# Patient Record
Sex: FEMALE | Race: BLACK OR AFRICAN AMERICAN | NOT HISPANIC OR LATINO | Employment: UNEMPLOYED | URBAN - METROPOLITAN AREA
[De-identification: names, ages, dates, MRNs, and addresses within clinical notes are randomized per-mention and may not be internally consistent; named-entity substitution may affect disease eponyms.]

---

## 2018-04-05 ENCOUNTER — APPOINTMENT (OUTPATIENT)
Dept: ULTRASOUND IMAGING | Facility: CLINIC | Age: 23
End: 2018-04-05
Attending: EMERGENCY MEDICINE
Payer: COMMERCIAL

## 2018-04-05 ENCOUNTER — HOSPITAL ENCOUNTER (EMERGENCY)
Facility: CLINIC | Age: 23
Discharge: HOME | End: 2018-04-05
Attending: EMERGENCY MEDICINE | Admitting: EMERGENCY MEDICINE
Payer: COMMERCIAL

## 2018-04-05 VITALS
SYSTOLIC BLOOD PRESSURE: 102 MMHG | RESPIRATION RATE: 18 BRPM | OXYGEN SATURATION: 100 % | WEIGHT: 124 LBS | DIASTOLIC BLOOD PRESSURE: 67 MMHG | TEMPERATURE: 98.4 F | BODY MASS INDEX: 21.97 KG/M2 | HEIGHT: 63 IN | HEART RATE: 83 BPM

## 2018-04-05 DIAGNOSIS — R10.84 ABDOMINAL PAIN, GENERALIZED: ICD-10-CM

## 2018-04-05 DIAGNOSIS — N93.9 VAGINAL BLEEDING: ICD-10-CM

## 2018-04-05 DIAGNOSIS — Z33.1 PREGNANCY, INCIDENTAL: ICD-10-CM

## 2018-04-05 LAB
ABO + RH BLD: NORMAL
ABO + RH BLD: NORMAL
ALBUMIN UR-MCNC: NEGATIVE MG/DL
APPEARANCE UR: CLEAR
B-HCG SERPL-ACNC: 20 IU/L (ref 0–5)
BACTERIA #/AREA URNS HPF: ABNORMAL /HPF
BASOPHILS # BLD AUTO: 0 10E9/L (ref 0–0.2)
BASOPHILS NFR BLD AUTO: 0.2 %
BILIRUB UR QL STRIP: NEGATIVE
BLOOD BANK CMNT PATIENT-IMP: NORMAL
BLOOD BANK CMNT PATIENT-IMP: NORMAL
COLOR UR AUTO: ABNORMAL
DIFFERENTIAL METHOD BLD: NORMAL
EOSINOPHIL # BLD AUTO: 0.1 10E9/L (ref 0–0.7)
EOSINOPHIL NFR BLD AUTO: 1.4 %
ERYTHROCYTE [DISTWIDTH] IN BLOOD BY AUTOMATED COUNT: 13.3 % (ref 10–15)
FETAL CELL SCN BLD QL ROSETTE: NORMAL
GLUCOSE UR STRIP-MCNC: NEGATIVE MG/DL
HCT VFR BLD AUTO: 41.8 % (ref 35–47)
HGB BLD-MCNC: 13.6 G/DL (ref 11.7–15.7)
HGB UR QL STRIP: ABNORMAL
IMM GRANULOCYTES # BLD: 0 10E9/L (ref 0–0.4)
IMM GRANULOCYTES NFR BLD: 0.5 %
KETONES UR STRIP-MCNC: NEGATIVE MG/DL
LEUKOCYTE ESTERASE UR QL STRIP: NEGATIVE
LYMPHOCYTES # BLD AUTO: 0.8 10E9/L (ref 0.8–5.3)
LYMPHOCYTES NFR BLD AUTO: 13.9 %
MCH RBC QN AUTO: 28.2 PG (ref 26.5–33)
MCHC RBC AUTO-ENTMCNC: 32.5 G/DL (ref 31.5–36.5)
MCV RBC AUTO: 87 FL (ref 78–100)
MONOCYTES # BLD AUTO: 0.4 10E9/L (ref 0–1.3)
MONOCYTES NFR BLD AUTO: 6.7 %
MUCOUS THREADS #/AREA URNS LPF: PRESENT /LPF
NEUTROPHILS # BLD AUTO: 4.5 10E9/L (ref 1.6–8.3)
NEUTROPHILS NFR BLD AUTO: 77.3 %
NITRATE UR QL: NEGATIVE
NRBC # BLD AUTO: 0 10*3/UL
NRBC BLD AUTO-RTO: 0 /100
PH UR STRIP: 7 PH (ref 5–7)
PLATELET # BLD AUTO: 191 10E9/L (ref 150–450)
RBC # BLD AUTO: 4.83 10E12/L (ref 3.8–5.2)
RBC #/AREA URNS AUTO: 170 /HPF (ref 0–2)
RH IG VIALS RECOM PATIENT: NORMAL
SOURCE: ABNORMAL
SP GR UR STRIP: 1 (ref 1–1.03)
SQUAMOUS #/AREA URNS AUTO: 1 /HPF (ref 0–1)
UROBILINOGEN UR STRIP-MCNC: 0 MG/DL (ref 0–2)
WBC # BLD AUTO: 5.8 10E9/L (ref 4–11)
WBC #/AREA URNS AUTO: 5 /HPF (ref 0–5)

## 2018-04-05 PROCEDURE — 99284 EMERGENCY DEPT VISIT MOD MDM: CPT | Mod: 25

## 2018-04-05 PROCEDURE — 86900 BLOOD TYPING SEROLOGIC ABO: CPT | Performed by: EMERGENCY MEDICINE

## 2018-04-05 PROCEDURE — 86901 BLOOD TYPING SEROLOGIC RH(D): CPT | Performed by: EMERGENCY MEDICINE

## 2018-04-05 PROCEDURE — 85025 COMPLETE CBC W/AUTO DIFF WBC: CPT | Performed by: EMERGENCY MEDICINE

## 2018-04-05 PROCEDURE — 81001 URINALYSIS AUTO W/SCOPE: CPT | Performed by: EMERGENCY MEDICINE

## 2018-04-05 PROCEDURE — 76801 OB US < 14 WKS SINGLE FETUS: CPT

## 2018-04-05 PROCEDURE — 36415 COLL VENOUS BLD VENIPUNCTURE: CPT | Performed by: EMERGENCY MEDICINE

## 2018-04-05 PROCEDURE — 85461 HEMOGLOBIN FETAL: CPT | Performed by: EMERGENCY MEDICINE

## 2018-04-05 PROCEDURE — 84702 CHORIONIC GONADOTROPIN TEST: CPT | Performed by: EMERGENCY MEDICINE

## 2018-04-05 ASSESSMENT — ENCOUNTER SYMPTOMS: ABDOMINAL PAIN: 1

## 2018-04-05 NOTE — ED AVS SNAPSHOT
[facility] Emergency Department    [address]    [facility] [address]    Phone:  [phone]    Fax:  [phone]                                       Juliana Rodriguez   MRN: [id number]    Department:  [facility] Emergency Department   Date of Visit:  4/5/2018           After Visit Summary Signature Page     I have received my discharge instructions, and my questions have been answered. I have discussed any challenges I see with this plan with the nurse or doctor.    ..........................................................................................................................................  Patient/Patient Representative Signature      ..........................................................................................................................................  Patient Representative Print Name and Relationship to Patient    ..................................................               ................................................  Date                                            Time    ..........................................................................................................................................  Reviewed by Signature/Title    ...................................................              ..............................................  Date                                                            Time

## 2018-04-05 NOTE — ED NOTES
Pt is approx 8 weeks pregnant.  She had positive UPT at home.  She now has vaginal bleeding with abdominal cramping since 0930 this AM.

## 2018-04-05 NOTE — ED PROVIDER NOTES
"  History     Chief Complaint:  Vaginal Bleeding      HPI   Juliana Rodriguez is a 22 year old female who is an estimated 8 weeks pregnant and  who presents to the emergency department for evaluation of vaginal bleeding. The patient miscarried in her second pregnancy when she was 6 months pregnant. She reports that the first day of her last period was 18. The patient had a positive UPT at home two days ago, and has not yet been evaluated by her Ob Gyn. She reports a small amount of vaginal bleeding of bright red blood this morning followed by some lower abdominal cramping. She reports liquid blood and no clots passed. The cramping and bleeding has continued since 930 and she has used two pads. The patient also notes some vaginal discharge and concern for yeast infection.     Allergies:  Influenza Vaccine Live    Medications:    Prenatal vitamins    Past Medical History:    Miscarriage    Past Surgical History:    History reviewed. No pertinent surgical history.    Family History:    Family history reviewed. No pertinent family history.    Social History:  The patient was accompanied to the emergency department by her .   Smoking Status: Never Smoker  Smokeless Tobacco: Unknown  Alcohol Use: No  Marital Status:      Review of Systems   Gastrointestinal: Positive for abdominal pain.   Genitourinary: Positive for vaginal bleeding and vaginal discharge.   All other systems reviewed and are negative.    Physical Exam     Patient Vitals for the past 24 hrs:   BP Temp Temp src Pulse Heart Rate Resp SpO2 Height Weight   18 1445 102/67 - - 83 83 - 100 % - -   18 1430 101/68 - - - - - - - -   18 1322 101/65 - - 71 71 - 100 % 1.6 m (5' 3\") 56.2 kg (124 lb)   18 1223 116/80 98.4  F (36.9  C) Temporal - 83 18 99 % 1.6 m (5' 3\") 56.2 kg (124 lb)     Physical Exam    Constitutional:  Pleasant, age appropriate.       Resting comfortably in the bed.  Eyes:    Conjunctiva normal  Neck:  "   Supple, no meningismus.     CV:     Regular rate and rhythm.      No murmurs, rubs or gallops.     No lower extremity edema.  PULM:    Clear to auscultation bilateral.       No respiratory distress.      Good air exchange.  ABD:    Soft, non-distended.       No abdominal tenderness.     Bowel sounds normal.     No pulsatile masses.       No rebound, guarding or rigidity.     No CVA tenderness.   :    Normal external genitalia.     No perineal lesions.     Small amount of dark blood in the in the vaginal vault.     No clots present.     No visualized tissue     No vaginal discharge.     Cervical os is not open.     No cervical motion tenderness     No adnexal mass.  MSK:     No gross deformity to all four extremities.   LYMPH:   No cervical lymphadenopathy.  NEURO:   Alert.  Good muscular tone, no atrophy.   Skin:    Warm, dry and intact.    Psych:    Mood is good and affect is appropriate.      Emergency Department Course     Imaging:  Radiology findings were communicated with the patient who voiced understanding of the findings.    US OB < 14 Weeks Single  1. No intrauterine gestation identified. Differential diagnosis  discussed above.  2. Normal pelvic ultrasound.  Reading per radiology.     Laboratory:  Laboratory findings were communicated with the patient who voiced understanding of the findings.    CBC: WBC 5.8, HGB 13.6,   HCG quantitative pregnancy blood: 20 (H)  UA: Blood Large (A), RBC/ (H), Bacteria Few (A), Mucous Present (A), o/w Negative   Rh immune globin study: O Positive    Emergency Department Course:     Nursing notes and vitals reviewed.     I performed an exam of the patient as documented above.     IV was inserted and blood was drawn for laboratory testing, results above.     The patient provided a urine sample here in the emergency department. This was sent for laboratory testing, findings above.    The patient was sent for an ultrasound while in the emergency department,  results above.    I personally reviewed the laboratory and ultrasound results with the patient and answered all related questions prior to discharge.    Impression & Plan      Medical Decision Making:  Juliana Rodriguez is a 22 year old female who presents to the emergency department today with vaginal bleeding and lower abdominal cramping.  Pelvic examination reveals no evidence of vaginitis, cervicitis or PID.  Unfortunately hCG is much lower than expected at 20 and a pelvic ultrasound reveals no evidence of intra-uterine pregnancy.  Symptoms are really quite consistent with spontaneous  and would be very unlikely to represent early normal intrauterine pregnancy with inappropriate dates or ectopic pregnancy although cannot be certain at this time.  Patient safe for discharge home.  She is not a RhoGam candidate.  Patient to follow-up with OB/GYN in the next 2-3 days.    Diagnosis:    ICD-10-CM    1. Abdominal pain, generalized R10.84    2. Pregnancy, incidental Z33.1    3. Vaginal bleeding N93.9      Disposition:   The patient was discharged home.      Scribe Disclosure:  I, Marielena Huynh, am serving as a scribe at 1:34 PM on 2018 to document services personally performed by Rajeev Tuttle MD based on my observations and the provider's statements to me.    [facility] EMERGENCY DEPARTMENT       Rajeev Tuttle MD  [phone]

## 2018-04-05 NOTE — ED AVS SNAPSHOT
[facility] Emergency Department    [address] [place] [address]    Phone:  [phone]    Fax:  [phone]                                       Juliana Rodriguez   MRN: [id number]    Department:  [facility] Emergency Department   Date of Visit:  4/5/2018           Patient Information     Date Of Birth          1995        Your diagnoses for this visit were:     Abdominal pain, generalized     Pregnancy, incidental     Vaginal bleeding        You were seen by Rajeev Tuttle MD.      Follow-up Information     Follow up with Susy Liao DO In 3 days.    Specialty:  OB/Gyn    Contact information:    [facility]  [id number]   [place] [address]  [phone]        Discharge References/Attachments     ABDOMINAL PAIN, EARLY PREGNANCY (ENGLISH)      24 Hour Appointment Hotline       To make an appointment at any [facility], call [phone] ([phone]). If you don't have a family doctor or clinic, we will help you find one. [place] clinics are conveniently located to serve the needs of you and your family.             Review of your medicines      Our records show that you are taking the medicines listed below. If these are incorrect, please call your family doctor or clinic.        Dose / Directions Last dose taken    breast pump Misc   Dose:  1 each   Quantity:  1 each        1 each as needed   Refills:  0        ibuprofen 600 MG tablet   Commonly known as:  ADVIL/MOTRIN   Dose:  600 mg   Quantity:  30 tablet        Take 1 tablet (600 mg) by mouth every 6 hours as needed for other (cramping)   Refills:  1        prenatal multivitamin plus iron 27-0.8 MG Tabs per tablet   Dose:  1 tablet   Quantity:  90 tablet        Take 1 tablet by mouth daily   Refills:  3        senna-docusate 8.6-50 MG per tablet   Commonly known as:  SENOKOT-S;PERICOLACE   Dose:  1-2 tablet   Quantity:  30 tablet        Take 1-2 tablets by mouth 2 times  daily   Refills:  1                Procedures and tests performed during your visit     CBC + differential    HCG QUANTitative pregnancy (blood)    Rh Immune Globulin Study    Rho (D) immune globulin (RhoGam) Lab Study    UA with Microscopic    US OB < 14 Weeks Single      Orders Needing Specimen Collection     None      Pending Results     Date and Time Order Name Status Description    4/5/2018 1337 US OB < 14 Weeks Single Preliminary             Pending Culture Results     No orders found from 4/3/2018 to 4/6/2018.            Pending Results Instructions     If you had any lab results that were not finalized at the time of your Discharge, you can call the ED Lab Result RN at [phone]. You will be contacted by this team for any positive Lab results or changes in treatment. The nurses are available 7 days a week from 10A to 6:30P.  You can leave a message 24 hours per day and they will return your call.        Test Results From Your Hospital Stay        4/5/2018 12:45 PM      Component Results     Component    Rhogam Order    Order received    Comment:    See Rhogam Study/Suitability         4/5/2018  2:02 PM      Component Results     Component    ABO    O    RH(D)    Pos    Fetal Blood Screen    Canceled, Test credited    Blood Bank Comment    Not suitable for Rh Immune Globulin  Patient is Rh positive      Amount of RHIG Required    Not suitable for Rh Immune Globulin         4/5/2018  2:30 PM      Component Results     Component Value Ref Range & Units Status    WBC 5.8 4.0 - 11.0 10e9/L Final    RBC Count 4.83 3.8 - 5.2 10e12/L Final    Hemoglobin 13.6 11.7 - 15.7 g/dL Final    Hematocrit 41.8 35.0 - 47.0 % Final    MCV 87 78 - 100 fl Final    MCH 28.2 26.5 - 33.0 pg Final    MCHC 32.5 31.5 - 36.5 g/dL Final    RDW 13.3 10.0 - 15.0 % Final    Platelet Count 191 150 - 450 10e9/L Final    Diff Method Automated Method  Final    % Neutrophils 77.3 % Final    % Lymphocytes 13.9 % Final    % Monocytes 6.7 % Final     % Eosinophils 1.4 % Final    % Basophils 0.2 % Final    % Immature Granulocytes 0.5 % Final    Nucleated RBCs 0 0 /100 Final    Absolute Neutrophil 4.5 1.6 - 8.3 10e9/L Final    Absolute Lymphocytes 0.8 0.8 - 5.3 10e9/L Final    Absolute Monocytes 0.4 0.0 - 1.3 10e9/L Final    Absolute Eosinophils 0.1 0.0 - 0.7 10e9/L Final    Absolute Basophils 0.0 0.0 - 0.2 10e9/L Final    Abs Immature Granulocytes 0.0 0 - 0.4 10e9/L Final    Absolute Nucleated RBC 0.0  Final         4/5/2018  2:49 PM      Component Results     Component Value Ref Range & Units Status    HCG Quantitative Serum 20 (H) 0 - 5 IU/L Final               4/5/2018  2:10 PM      Component Results     Component Value Ref Range & Units Status    Color Urine Straw  Final    Appearance Urine Clear  Final    Glucose Urine Negative NEG^Negative mg/dL Final    Bilirubin Urine Negative NEG^Negative Final    Ketones Urine Negative NEG^Negative mg/dL Final    Specific Gravity Urine 1.005 1.003 - 1.035 Final    Blood Urine Large (A) NEG^Negative Final    pH Urine 7.0 5.0 - 7.0 pH Final    Protein Albumin Urine Negative NEG^Negative mg/dL Final    Urobilinogen mg/dL 0.0 0.0 - 2.0 mg/dL Final    Nitrite Urine Negative NEG^Negative Final    Leukocyte Esterase Urine Negative NEG^Negative Final    Source Midstream Urine  Final    WBC Urine 5 0 - 5 /HPF Final    RBC Urine 170 (H) 0 - 2 /HPF Final    Bacteria Urine Few (A) NEG^Negative /HPF Final    Squamous Epithelial /HPF Urine 1 0 - 1 /HPF Final    Mucous Urine Present (A) NEG^Negative /LPF Final         4/5/2018  4:02 PM      Narrative     ULTRASOUND OBSTETRIC LESS THAN 14 WEEKS SINGLE  4/5/2018 3:30 PM    HISTORY: First trimester bleeding.    TECHNIQUE: Transabdominal exam only. Patient refused transvaginal  study.    COMPARISON: None.    FINDINGS: No intrauterine gestational sac is identified. Endometrial  stripe thickness is 0.8 cm. Differential diagnosis includes very early  intrauterine gestation, complete  spontaneous  or, less likely,  ectopic pregnancy. Clinical correlation with serum beta hCG is  recommended.    Right ovary: Unremarkable.  Left ovary: Unremarkable.  Adnexal mass: None.  Free pelvic fluid: None.        Impression     IMPRESSION:   1. No intrauterine gestation identified. Differential diagnosis  discussed above.  2. Normal pelvic ultrasound.                  Clinical Quality Measure: Blood Pressure Screening     Your blood pressure was checked while you were in the emergency department today. The last reading we obtained was  BP: 102/67 . Please read the guidelines below about what these numbers mean and what you should do about them.  If your systolic blood pressure (the top number) is less than 120 and your diastolic blood pressure (the bottom number) is less than 80, then your blood pressure is normal. There is nothing more that you need to do about it.  If your systolic blood pressure (the top number) is 120-139 or your diastolic blood pressure (the bottom number) is 80-89, your blood pressure may be higher than it should be. You should have your blood pressure rechecked within a year by a primary care provider.  If your systolic blood pressure (the top number) is 140 or greater or your diastolic blood pressure (the bottom number) is 90 or greater, you may have high blood pressure. High blood pressure is treatable, but if left untreated over time it can put you at risk for heart attack, stroke, or kidney failure. You should have your blood pressure rechecked by a primary care provider within the next 4 weeks.  If your provider in the emergency department today gave you specific instructions to follow-up with your doctor or provider even sooner than that, you should follow that instruction and not wait for up to 4 weeks for your follow-up visit.        Thank you for choosing [facility]       Thank you for choosing [facility] for your care. Our goal is always to provide you with excellent care.  "Hearing back from our patients is one way we can continue to improve our services. Please take a few minutes to complete the written survey that you may receive in the mail after you visit with us. Thank you!        [employer] Information     [employer] lets you send messages to your doctor, view your test results, renew your prescriptions, schedule appointments and more. To sign up, go to [email or web address] . Click on \"Log in\" on the left side of the screen, which will take you to the Welcome page. Then click on \"Sign up Now\" on the right side of the page.     You will be asked to enter the access code listed below, as well as some personal information. Please follow the directions to create your username and password.     Your access code is: [email or web address]  Expires: 2018  4:18 PM     Your access code will  in 90 days. If you need help or a new code, please call your [facility] clinic or [phone].        Care EveryWhere ID     This is your Care EveryWhere ID. This could be used by other organizations to access your [facility] medical records  [id number]        Equal Access to Services     FRANK CASTRO : Hadjerod Wyman, faith kumari, ibis randall, jo lucas . So [facility] [phone].    ATENCIÓN: Si habla español, tiene a castillo disposición servicios gratuitos de asistencia lingüística. Melany al [phone].    We comply with applicable federal civil rights laws and [place] laws. We do not discriminate on the basis of race, color, national origin, age, disability, sex, sexual orientation, or gender identity.            After Visit Summary       This is your record. Keep this with you and show to your community pharmacist(s) and doctor(s) at your next visit.                  "

## 2019-07-08 LAB — GROUP B STREP PCR: NEGATIVE

## 2019-08-03 ENCOUNTER — HOSPITAL ENCOUNTER (INPATIENT)
Facility: CLINIC | Age: 24
LOS: 2 days | Discharge: HOME | End: 2019-08-05
Attending: OBSTETRICS & GYNECOLOGY | Admitting: OBSTETRICS & GYNECOLOGY
Payer: COMMERCIAL

## 2019-08-03 PROBLEM — O47.9 UTERINE CONTRACTIONS DURING PREGNANCY: Status: ACTIVE | Noted: 2019-08-03

## 2019-08-03 LAB
ABO + RH BLD: NORMAL
ABO + RH BLD: NORMAL
HGB BLD-MCNC: 11.7 G/DL (ref 11.7–15.7)
SPECIMEN EXP DATE BLD: NORMAL

## 2019-08-03 PROCEDURE — 25000125 ZZHC RX 250: Performed by: OBSTETRICS & GYNECOLOGY

## 2019-08-03 PROCEDURE — 25000128 H RX IP 250 OP 636: Performed by: OBSTETRICS & GYNECOLOGY

## 2019-08-03 PROCEDURE — 86901 BLOOD TYPING SEROLOGIC RH(D): CPT | Performed by: OBSTETRICS & GYNECOLOGY

## 2019-08-03 PROCEDURE — 10907ZC DRAINAGE OF AMNIOTIC FLUID, THERAPEUTIC FROM PRODUCTS OF CONCEPTION, VIA NATURAL OR ARTIFICIAL OPENING: ICD-10-PCS | Performed by: OBSTETRICS & GYNECOLOGY

## 2019-08-03 PROCEDURE — 25000132 ZZH RX MED GY IP 250 OP 250 PS 637: Performed by: OBSTETRICS & GYNECOLOGY

## 2019-08-03 PROCEDURE — 40000083 ZZH STATISTIC IP LACTATION SERVICES 1-15 MIN

## 2019-08-03 PROCEDURE — 25800030 ZZH RX IP 258 OP 636: Performed by: OBSTETRICS & GYNECOLOGY

## 2019-08-03 PROCEDURE — 85018 HEMOGLOBIN: CPT | Performed by: OBSTETRICS & GYNECOLOGY

## 2019-08-03 PROCEDURE — 36415 COLL VENOUS BLD VENIPUNCTURE: CPT | Performed by: OBSTETRICS & GYNECOLOGY

## 2019-08-03 PROCEDURE — 0KQM0ZZ REPAIR PERINEUM MUSCLE, OPEN APPROACH: ICD-10-PCS | Performed by: OBSTETRICS & GYNECOLOGY

## 2019-08-03 PROCEDURE — G0463 HOSPITAL OUTPT CLINIC VISIT: HCPCS

## 2019-08-03 PROCEDURE — 86780 TREPONEMA PALLIDUM: CPT | Performed by: OBSTETRICS & GYNECOLOGY

## 2019-08-03 PROCEDURE — 72200001 ZZH LABOR CARE VAGINAL DELIVERY SINGLE

## 2019-08-03 PROCEDURE — 12000000 ZZH R&B MED SURG/OB

## 2019-08-03 PROCEDURE — 86900 BLOOD TYPING SEROLOGIC ABO: CPT | Performed by: OBSTETRICS & GYNECOLOGY

## 2019-08-03 RX ORDER — OXYTOCIN/0.9 % SODIUM CHLORIDE 30/500 ML
100-340 PLASTIC BAG, INJECTION (ML) INTRAVENOUS CONTINUOUS PRN
Status: COMPLETED | OUTPATIENT
Start: 2019-08-03 | End: 2019-08-03

## 2019-08-03 RX ORDER — OXYTOCIN 10 [USP'U]/ML
10 INJECTION, SOLUTION INTRAMUSCULAR; INTRAVENOUS
Status: DISCONTINUED | OUTPATIENT
Start: 2019-08-03 | End: 2019-08-03

## 2019-08-03 RX ORDER — MISOPROSTOL 200 UG/1
800 TABLET ORAL ONCE
Status: COMPLETED | OUTPATIENT
Start: 2019-08-03 | End: 2019-08-03

## 2019-08-03 RX ORDER — AMOXICILLIN 250 MG
2 CAPSULE ORAL 2 TIMES DAILY
Status: DISCONTINUED | OUTPATIENT
Start: 2019-08-03 | End: 2019-08-05 | Stop reason: HOSPADM

## 2019-08-03 RX ORDER — BISACODYL 10 MG
10 SUPPOSITORY, RECTAL RECTAL DAILY PRN
Status: DISCONTINUED | OUTPATIENT
Start: 2019-08-05 | End: 2019-08-05 | Stop reason: HOSPADM

## 2019-08-03 RX ORDER — MISOPROSTOL 200 UG/1
TABLET ORAL
Status: DISCONTINUED
Start: 2019-08-03 | End: 2019-08-03 | Stop reason: HOSPADM

## 2019-08-03 RX ORDER — LANOLIN 100 %
OINTMENT (GRAM) TOPICAL
Status: DISCONTINUED | OUTPATIENT
Start: 2019-08-03 | End: 2019-08-05 | Stop reason: HOSPADM

## 2019-08-03 RX ORDER — ACETAMINOPHEN 325 MG/1
650 TABLET ORAL EVERY 4 HOURS PRN
Status: DISCONTINUED | OUTPATIENT
Start: 2019-08-03 | End: 2019-08-03

## 2019-08-03 RX ORDER — CARBOPROST TROMETHAMINE 250 UG/ML
250 INJECTION, SOLUTION INTRAMUSCULAR
Status: DISCONTINUED | OUTPATIENT
Start: 2019-08-03 | End: 2019-08-03

## 2019-08-03 RX ORDER — OXYCODONE AND ACETAMINOPHEN 5; 325 MG/1; MG/1
1 TABLET ORAL
Status: DISCONTINUED | OUTPATIENT
Start: 2019-08-03 | End: 2019-08-03

## 2019-08-03 RX ORDER — SODIUM CHLORIDE, SODIUM LACTATE, POTASSIUM CHLORIDE, CALCIUM CHLORIDE 600; 310; 30; 20 MG/100ML; MG/100ML; MG/100ML; MG/100ML
INJECTION, SOLUTION INTRAVENOUS CONTINUOUS
Status: DISCONTINUED | OUTPATIENT
Start: 2019-08-03 | End: 2019-08-03

## 2019-08-03 RX ORDER — OXYTOCIN 10 [USP'U]/ML
10 INJECTION, SOLUTION INTRAMUSCULAR; INTRAVENOUS
Status: DISCONTINUED | OUTPATIENT
Start: 2019-08-03 | End: 2019-08-05 | Stop reason: HOSPADM

## 2019-08-03 RX ORDER — OXYTOCIN/0.9 % SODIUM CHLORIDE 30/500 ML
340 PLASTIC BAG, INJECTION (ML) INTRAVENOUS CONTINUOUS PRN
Status: DISCONTINUED | OUTPATIENT
Start: 2019-08-03 | End: 2019-08-05 | Stop reason: HOSPADM

## 2019-08-03 RX ORDER — ONDANSETRON 2 MG/ML
4 INJECTION INTRAMUSCULAR; INTRAVENOUS EVERY 6 HOURS PRN
Status: DISCONTINUED | OUTPATIENT
Start: 2019-08-03 | End: 2019-08-03

## 2019-08-03 RX ORDER — NALOXONE HYDROCHLORIDE 0.4 MG/ML
.1-.4 INJECTION, SOLUTION INTRAMUSCULAR; INTRAVENOUS; SUBCUTANEOUS
Status: DISCONTINUED | OUTPATIENT
Start: 2019-08-03 | End: 2019-08-05 | Stop reason: HOSPADM

## 2019-08-03 RX ORDER — IBUPROFEN 800 MG/1
800 TABLET, FILM COATED ORAL
Status: COMPLETED | OUTPATIENT
Start: 2019-08-03 | End: 2019-08-03

## 2019-08-03 RX ORDER — NALOXONE HYDROCHLORIDE 0.4 MG/ML
.1-.4 INJECTION, SOLUTION INTRAMUSCULAR; INTRAVENOUS; SUBCUTANEOUS
Status: DISCONTINUED | OUTPATIENT
Start: 2019-08-03 | End: 2019-08-03

## 2019-08-03 RX ORDER — IBUPROFEN 800 MG/1
800 TABLET, FILM COATED ORAL EVERY 6 HOURS PRN
Status: DISCONTINUED | OUTPATIENT
Start: 2019-08-03 | End: 2019-08-05 | Stop reason: HOSPADM

## 2019-08-03 RX ORDER — AMOXICILLIN 250 MG
1 CAPSULE ORAL 2 TIMES DAILY
Status: DISCONTINUED | OUTPATIENT
Start: 2019-08-03 | End: 2019-08-05 | Stop reason: HOSPADM

## 2019-08-03 RX ORDER — METHYLERGONOVINE MALEATE 0.2 MG/ML
200 INJECTION INTRAVENOUS
Status: COMPLETED | OUTPATIENT
Start: 2019-08-03 | End: 2019-08-03

## 2019-08-03 RX ORDER — FENTANYL CITRATE 50 UG/ML
50-100 INJECTION, SOLUTION INTRAMUSCULAR; INTRAVENOUS
Status: DISCONTINUED | OUTPATIENT
Start: 2019-08-03 | End: 2019-08-03

## 2019-08-03 RX ORDER — OXYTOCIN/0.9 % SODIUM CHLORIDE 30/500 ML
100 PLASTIC BAG, INJECTION (ML) INTRAVENOUS CONTINUOUS
Status: DISCONTINUED | OUTPATIENT
Start: 2019-08-03 | End: 2019-08-05 | Stop reason: HOSPADM

## 2019-08-03 RX ORDER — HYDROCORTISONE 2.5 %
CREAM (GRAM) TOPICAL 3 TIMES DAILY PRN
Status: DISCONTINUED | OUTPATIENT
Start: 2019-08-03 | End: 2019-08-05 | Stop reason: HOSPADM

## 2019-08-03 RX ORDER — ACETAMINOPHEN 325 MG/1
650 TABLET ORAL EVERY 4 HOURS PRN
Status: DISCONTINUED | OUTPATIENT
Start: 2019-08-03 | End: 2019-08-05 | Stop reason: HOSPADM

## 2019-08-03 RX ADMIN — METHYLERGONOVINE MALEATE 200 MCG: 0.2 INJECTION, SOLUTION INTRAMUSCULAR; INTRAVENOUS at 11:45

## 2019-08-03 RX ADMIN — MISOPROSTOL 800 MCG: 200 TABLET ORAL at 12:00

## 2019-08-03 RX ADMIN — LIDOCAINE HYDROCHLORIDE 10 ML: 10 INJECTION, SOLUTION EPIDURAL; INFILTRATION; INTRACAUDAL; PERINEURAL at 11:31

## 2019-08-03 RX ADMIN — SENNOSIDES AND DOCUSATE SODIUM 1 TABLET: 8.6; 5 TABLET ORAL at 21:04

## 2019-08-03 RX ADMIN — SODIUM CHLORIDE, POTASSIUM CHLORIDE, SODIUM LACTATE AND CALCIUM CHLORIDE: 600; 310; 30; 20 INJECTION, SOLUTION INTRAVENOUS at 08:40

## 2019-08-03 RX ADMIN — ACETAMINOPHEN 650 MG: 325 TABLET, FILM COATED ORAL at 23:34

## 2019-08-03 RX ADMIN — OXYTOCIN 340 ML/HR: 10 INJECTION INTRAVENOUS at 11:31

## 2019-08-03 RX ADMIN — IBUPROFEN 800 MG: 800 TABLET ORAL at 14:24

## 2019-08-03 RX ADMIN — FENTANYL CITRATE 50 MCG: 50 INJECTION INTRAMUSCULAR; INTRAVENOUS at 11:39

## 2019-08-03 RX ADMIN — IBUPROFEN 800 MG: 800 TABLET ORAL at 21:04

## 2019-08-03 RX ADMIN — FENTANYL CITRATE 50 MCG: 50 INJECTION INTRAMUSCULAR; INTRAVENOUS at 11:54

## 2019-08-03 NOTE — PROVIDER NOTIFICATION
08/03/19 0929   Provider Notification   Provider Name/Title Dr Salguero   Method of Notification At Bedside   Request Evaluate in Person     MD at bedside for AROM. SVE 8cm. Clear fluid. MD in department when needed

## 2019-08-03 NOTE — H&P
[facility] Labor and Delivery History and Physical    Juliana Rodriguez MRN# [id number]   Age: 24 year old YOB: 1995     Date of Admission:  8/3/2019         CC:    Uterine contractions         HPI:   Juliana Rodriguez is a 24 year old  at 40w3d by 7w0d US who presents with reports of contractions that are regular and painful. No LOF or VB. +FM. Pregnancy is complicated by history of  deliveries at 21 weeks and 35 weeks as well as Hep B non-immunity. Patient had treatment with serial cervical lengths and vaginal progesterone.           Pregnancy history:     OBSTETRIC HISTORY:    OB History    Para Term  AB Living   4 1 0 1 1 1   SAB TAB Ectopic Multiple Live Births   1 0 0 0 1      # Outcome Date GA Lbr Bunny/2nd Weight Sex Delivery Anes PTL Lv   4 Current            3  16 35w3d 06:50 / 00:23 2.631 kg (5 lb 12.8 oz) F Vag-Spont Local Y SABA      Apgar1: 8  Apgar5: 9   2 SAB 13     SAB      1                 Prenatal Labs:   Blood Type: O+  Rubella: Immune  HIV: NR  Hep B Surface Ag: NR  Syphilis: NR x 2  GCT: Normal  Last Hgb: 10.6 on     GBS Status:   Negative    Medication Prior to Admission  Medications Prior to Admission   Medication Sig Dispense Refill Last Dose     Prenatal Vit-Fe Fumarate-FA (PRENATAL MULTIVITAMIN  PLUS IRON) 27-0.8 MG TABS Take 1 tablet by mouth daily 90 tablet 3 Past Week at Unknown time     senna-docusate (SENOKOT-S;PERICOLACE) 8.6-50 MG per tablet Take 1-2 tablets by mouth 2 times daily 30 tablet 1 Past Month at Unknown time     Misc. Devices (BREAST PUMP) MISC 1 each as needed 1 each 0    .        Maternal Past Medical History:   History reviewed. No pertinent past medical history.                    Family History:     Family History   Problem Relation Age of Onset     Unknown/Adopted Maternal Grandmother      Unknown/Adopted Maternal Grandfather      Unknown/Adopted Paternal Grandmother      Unknown/Adopted  Paternal Grandfather      Unknown/Adopted Mother      Unknown/Adopted Father      Unknown/Adopted Brother      Unknown/Adopted Sister             Social History:     Social History     Tobacco Use     Smoking status: Never Smoker     Smokeless tobacco: Never Used   Substance Use Topics     Alcohol use: No     Drug use: No            Review of Systems:   Negative except as noted above in the HPI         Physical Exam:   BP: 109/70. HR: 83  Cardio: RRR  Resp: No m/g/r  Abdomen: gravid, soft, nt  Cervix: 4/80/-2  Presentation:Cephalic by SVE, previous BSUS in clinic  Membranes: Intact  Fetal Heart Rate Tracin, minimal, no decels, no accels  Tocometer: Q2-3 minutes    Imaging:  Dating Ultrasound:   Date: 2018 GA: 7w0d ERNESTINA: 2019 Findings: Single IUP, +FCA    Anatomy Ultrasound:   Date: 3/14/2019 GA: 20w0d ERNESTINA: Same. Findings: Single living IUP. Normal anatomy. Normal fluid.  Placenta location: posterior          Assessment:   Juliana NOY Rodriguez is a 24 year old  at 40w3d by 7w0d US admitted for labor. Pregnancy c/b h/o PTD x 2. Patient is also Hep B non-immune        Plan:   Labor: Spontaneous at this time. Recommended AROM to speed things up but patient declines at this time. Will consider and plan to reevaluate in 30-60 minutes.  FWB: Cat 2 with minimal variability but no decels. Continue to monitor closely.  GBS status: Negative  Pain management. Prefers comfort measures, possible nitrous PRN  Anticipate       Ashley Hieronimus, MD [facility]  8/3/2019 8:18 AM

## 2019-08-03 NOTE — PLAN OF CARE
Vss afebrile. Ff@U.  Pt c/o cramping, ibuprofen given at 1400. Voiding with out problems. Pt up indep in room. Pt tolerating regular diet. Pt breast and bottle feeding.   Patients mobililty level scored using the bedside mobility assistance tool (BMAT). Patient is at a mobility level test number: 4. Mobility equipment used: none required. Required assist of 0 staff members. Further use of BMAT scoring not required.

## 2019-08-03 NOTE — L&D DELIVERY NOTE
OB Vaginal Delivery Note    Juliana Rodriguez MRN# [id number]   Age: 24 year old YOB: 1995       GA: 40w3d  GP:   Labor Complications: None     QBL: 403 mL  Delivery Type: Vaginal, Spontaneous   ROM to Delivery Time: rupture date, rupture time, delivery date, or delivery time have not been documented  [place] Weight: 3.48 kg (7 lb 10.8 oz)    1 Minute 5 Minute 10 Minute   Apgar Totals: 8    9          ERIN MENDOZA;SHAUNNA BARRAGAN     Delivery Details:  Juliana Rodriguez, a 24 year old  female delivered a viable infant with apgars of 8   and 9  . Patient was fully dilated and pushing after 4  hours 31  minutes in active labor. Delivery was via vaginal, spontaneous  to a sterile field under none  anesthesia. Infant delivered in vertex  right  occiput  anterior  position. Anterior and posterior shoulders delivered without difficulty. The cord was clamped, cut twice and 3 vessels  were noted. Cord blood was obtained in routine fashion with the following disposition: lab .      Cord complications: nuchal   Placenta delivered at 8/3/2019 11:29 AM . Placental disposition was Hospital disposal . Fundal massage performed and fundus found to be firm.     Episiotomy: none    Perineum, vagina, cervix were inspected, and the following lacerations were noted:   Perineal lacerations: 2nd      labial laceration: left              Any lacerations were repaired in the usual fashion using 3-0 and 4-0 Vicryl.    There was excess bleeding following delivery. Patient received IM Methergine and rectal Cytotec. She also underwent manual sweep at which time a small fragment of placenta was removed. Bleeding improved after this and uterus was firm.    Excellent hemostasis was noted. Needle count correct. Infant and patient in delivery room in good and stable condition.        Labor Event Times    Labor onset date:  8/3/19 Onset time:   6:30 AM      Labor Events     labor?:  No  Labor Type:  Spontaneous      Antibiotics received during labor?:  No     Rupture date/time:  0931   Rupture type:  Artificial Rupture of Membranes  Fluid color:  Clear  Fluid odor:  Normal     Delivery/Placenta Date and Time    Delivery Date:  8/3/19 Delivery Time:  11:27 AM      Vaginal Counts     Initial count performed by 2 team members:   Two Team Members   Dr Noemy BOURGEOIS, RN       Des Arc Suture [place] Sponges Instruments   Initial counts 2  5    Added to count  2     Final counts       Placed during labor Accounted for at the end of labor           Apgars    Living status:  Living   1 Minute 5 Minute 10 Minute 15 Minute 20 Minute   Skin color: 0  1       Heart rate: 2  2       Reflex irritability: 2  2       Muscle tone: 2  2       Respiratory effort: 2  2       Total: 8  9       Apgars assigned by:  ERIN BOURGEOIS RN     Cord    Vessels:  3 Vessels Complications:  Nuchal   Cord Blood Disposition:  Lab       Skin to Skin and Feeding Plan    Skin to skin initiation date/time: 1/8/1841    Skin to skin with:  Mother  Skin to skin end date/time:        Labor Events and Shoulder Dystocia    Fetal Tracing Prior to Delivery:  Category 2     Delivery (Maternal) (Provider to Complete) ([id number])    Episiotomy:  None  Perineal lacerations:  2nd Repaired?:  Yes   Labial laceration:  left Repaired?:  Yes   Vaginal laceration?:  No    Cervical laceration?:  No       Blood Loss  Mother: Juliana Rodriguez #[id number]   Start of Mother's Information    IO Blood Loss  08/03/19 0630 - 08/03/19 1208    Total QBL Blood Loss (mL) Hospital Encounter 403 mL    Total  403 mL         End of Mother's Information  Mother: Juliana Rodriguez #[id number]         Delivery - Provider to Complete ([id number])    Delivering clinician:  Candis Salguero MD  Attempted Delivery Types (Choose all that apply):  Spontaneous Vaginal Delivery  Delivery Type (Choose the 1 that will go to the Birth History):  Vaginal, Spontaneous   Other personnel:   Provider Role   Erin Hall, CJ  Delivery Nurse   Oneyda Ivan, RN Delivery Assist         Placenta    Delayed Cord Clamping:  Done  Removal:  Spontaneous  Disposition:  Hospital disposal     Anesthesia    Method:  None          Presentation and Position    Presentation:  Vertex  Position:  Right Occiput Anterior           Candis Salguero MD

## 2019-08-03 NOTE — PLAN OF CARE
Pt arrived from L/D at approx 142o with baby. Vss, ibuprofen given. Ff@U. Breast and bottle feeding. Iv sl. Pt oriented to room, call light, infant safety, and poc.

## 2019-08-03 NOTE — PROVIDER NOTIFICATION
08/03/19 0831   Provider Notification   Provider Name/Title Dr Salguero   Method of Notification At Bedside   Request Evaluate in Person     MD at bedside. Discussed POC. Pt decline AROM at this time. Discussed minimal variability in FHR. Will initiate fluid bolus. Per MD keep pt NPO. Ok with anything for pain control.

## 2019-08-03 NOTE — PROGRESS NOTES
Labor Note    In for AROM. Clear fluid. SVE /0. Anticipate  soon.    Ashley Hieronimus, MD [facility]  8/3/2019 9:35 AM

## 2019-08-03 NOTE — PLAN OF CARE
Data: Juliana Rodriguez transferred to [facility] via wheelchair at 1420. Baby transferred via parent's arms.  Action: Receiving unit notified of transfer: Yes. Patient and family notified of room change. Report given to CJ Solis at 1430. Belongings sent to receiving unit. Accompanied by Registered Nurse. Oriented patient to surroundings. Call light within reach. ID bands double-checked with receiving RN.  Response: Patient tolerated transfer and is stable.

## 2019-08-03 NOTE — PLAN OF CARE
Data: Patient presented to [facility]: 8/3/2019  7:54 AM.  Reason for maternal/fetal assessment is uterine contractions. Patient reports that at 0630 this morning she started feeling stronger contractions that were closer together.  Patient is a .  Prenatal record reviewed. Pregnancy has been uncomplicated..  Gestational Age 40w3d. VSS. Fetal movement present. Patient denies leaking of vaginal fluid/rupture of membranes, vaginal bleeding, nausea, vomiting, headache, visual disturbances, epigastric or URQ pain, significant edema. Support person is present.   Action: Verbal consent for EFM. Triage assessment completed. Bill of rights reviewed.  Response: Patient verbalized agreement with plan. Will contact Dr Candis Salguero with update and further orders.

## 2019-08-04 LAB
HGB BLD-MCNC: 11.7 G/DL (ref 11.7–15.7)
T PALLIDUM AB SER QL: NONREACTIVE

## 2019-08-04 PROCEDURE — 36415 COLL VENOUS BLD VENIPUNCTURE: CPT | Performed by: OBSTETRICS & GYNECOLOGY

## 2019-08-04 PROCEDURE — 12000000 ZZH R&B MED SURG/OB

## 2019-08-04 PROCEDURE — 25000132 ZZH RX MED GY IP 250 OP 250 PS 637: Performed by: OBSTETRICS & GYNECOLOGY

## 2019-08-04 PROCEDURE — 85018 HEMOGLOBIN: CPT | Performed by: OBSTETRICS & GYNECOLOGY

## 2019-08-04 RX ADMIN — SENNOSIDES AND DOCUSATE SODIUM 1 TABLET: 8.6; 5 TABLET ORAL at 20:47

## 2019-08-04 RX ADMIN — ACETAMINOPHEN 650 MG: 325 TABLET, FILM COATED ORAL at 16:12

## 2019-08-04 RX ADMIN — IBUPROFEN 800 MG: 800 TABLET ORAL at 20:48

## 2019-08-04 RX ADMIN — SENNOSIDES AND DOCUSATE SODIUM 1 TABLET: 8.6; 5 TABLET ORAL at 11:35

## 2019-08-04 RX ADMIN — IBUPROFEN 800 MG: 800 TABLET ORAL at 04:38

## 2019-08-04 RX ADMIN — IBUPROFEN 800 MG: 800 TABLET ORAL at 11:35

## 2019-08-04 NOTE — PLAN OF CARE
Patient meeting expected outcomes. Breastfeeding going well, also supplementing with formula per patients preference. Pain adequately controlled with tylenol and ibuprofen. Independent with self cares, needs occasional encouragement with  cares.

## 2019-08-04 NOTE — LACTATION NOTE
Lactation in to see patient. Patient breast and bottle feeding. States no issues or concerns, nursed her other daughter well with good milk supply. Encouraged to call prn

## 2019-08-04 NOTE — PLAN OF CARE
VSS; patient is meeting expected goals for this shift. Managing her cramping pain with motrin and tylenol. Very attentive to  and independent with cares. Breast feedings and supplementing with formula. Preparing for discharge to home tomorrow. Encouraged to complete birth certificated and depression paperwork.

## 2019-08-04 NOTE — PROGRESS NOTES
PPD #1    Julianamirtha Rodriguez is a 24 year old  s/p  yesterday.  No complaints. Wants to stay until tomorrow.    OBJECTIVE:  Blood pressure 104/66, pulse 63, temperature 98.2  F (36.8  C), temperature source Oral, resp. rate 16, unknown if currently breastfeeding.    WDWN woman in NAD   FF U -1    Hemoglobin   Date Value Ref Range Status   2019 11.7 11.7 - 15.7 g/dL Final   2019 11.7 11.7 - 15.7 g/dL Final       ASSESSMENT:  S/P , doing well.  Active Problems:     (spontaneous vaginal delivery) (2016)    Encounter for triage in pregnant patient (8/3/2019)    Uterine contractions during pregnancy (8/3/2019)        PLAN:  Continue post partum cares.  Anticipate DC tomorrow.    Nisha Rodriguez MD 2019

## 2019-08-04 NOTE — PLAN OF CARE
Vss afebrile. Ff/1. Pain cramping controlled with tylenol and ibuprofen. Pt up indep in room, showered  today. Pt tolerating regular diet. Pt voiding with out problems. Breast and bottle feeding. Pt attentive to baby's needs.

## 2019-08-05 ENCOUNTER — LACTATION ENCOUNTER (OUTPATIENT)
Age: 24
End: 2019-08-05

## 2019-08-05 VITALS
HEART RATE: 65 BPM | RESPIRATION RATE: 16 BRPM | SYSTOLIC BLOOD PRESSURE: 101 MMHG | DIASTOLIC BLOOD PRESSURE: 65 MMHG | TEMPERATURE: 98.6 F

## 2019-08-05 PROCEDURE — 25000132 ZZH RX MED GY IP 250 OP 250 PS 637: Performed by: OBSTETRICS & GYNECOLOGY

## 2019-08-05 RX ORDER — IBUPROFEN 800 MG/1
800 TABLET, FILM COATED ORAL EVERY 6 HOURS PRN
Qty: 20 TABLET | Refills: 0 | Status: ON HOLD | OUTPATIENT
Start: 2019-08-05 | End: 2021-01-16

## 2019-08-05 RX ORDER — VITAMIN A ACETATE, .BETA.-CAROTENE, ASCORBIC ACID, CHOLECALCIFEROL, .ALPHA.-TOCOPHEROL ACETATE, DL-, THIAMINE MONONITRATE, RIBOFLAVIN, NIACINAMIDE, PYRIDOXINE HYDROCHLORIDE, FOLIC ACID, CYANOCOBALAMIN, CALCIUM CARBONATE, FERROUS FUMARATE, ZINC OXIDE, AND CUPRIC OXIDE 2000; 2000; 120; 400; 22; 1.84; 3; 20; 10; 1; 12; 200; 27; 25; 2 [IU]/1; [IU]/1; MG/1; [IU]/1; MG/1; MG/1; MG/1; MG/1; MG/1; MG/1; UG/1; MG/1; MG/1; MG/1; MG/1
1 TABLET ORAL DAILY
Qty: 60 TABLET | Refills: 1 | Status: SHIPPED | OUTPATIENT
Start: 2019-08-05

## 2019-08-05 RX ADMIN — SENNOSIDES AND DOCUSATE SODIUM 1 TABLET: 8.6; 5 TABLET ORAL at 09:01

## 2019-08-05 RX ADMIN — IBUPROFEN 800 MG: 800 TABLET ORAL at 02:59

## 2019-08-05 RX ADMIN — IBUPROFEN 800 MG: 800 TABLET ORAL at 09:01

## 2019-08-05 NOTE — LACTATION NOTE
This note was copied from a baby's chart.  LC visit. Her baby has been nursing prior to formula supplements.  He takes formula per parental preference.  Her milk is transitioning and she noticed that her baby has not been interested in formula after nursing.  encouraged exclusive breastfeeding and reassured her that infant is getting enough volume directly from breastfeeding if not looking interested after nursing. Nipple cream was given.

## 2019-08-05 NOTE — DISCHARGE INSTRUCTIONS
Postpartum Vaginal Delivery Instructions  Return to clinic in 6 weeks for follow up  Lactation [phone]    Activity       Ask family and friends for help when you need it.    Do not place anything in your vagina for 6 weeks.    You are not restricted on other activities, but take it easy for a few weeks to allow your body to recover from delivery.  You are able to do any activities you feel up to that point.    No driving until you have stopped taking your pain medications (usually two weeks after delivery).     Call your health care provider if you have any of these symptoms:       Increased pain, swelling, redness, or fluid around your stiches from an episiotomy or perineal tear.    A fever above 100.4 F (38 C) with or without chills when placing a thermometer under your tongue.    You soak a sanitary pad with blood within 1 hour, or you see blood clots larger than a golf ball.    Bleeding that lasts more than 6 weeks.    Vaginal discharge that smells bad.    Severe pain, cramping or tenderness in your lower belly area.    A need to urinate more frequently (use the toilet more often), more urgently (use the toilet very quickly), or it burns when you urinate.    Nausea and vomiting.    Redness, swelling or pain around a vein in your leg.    Problems breastfeeding or a red or painful area on your breast.    Chest pain and cough or are gasping for air.    Problems coping with sadness, anxiety, or depression.  If you have any concerns about hurting yourself or the baby, call your provider immediately.     You have questions or concerns after you return home.     Keep your hands clean:  Always wash your hands before touching your perineal area and stitches.  This helps reduce your risk of infection.  If your hands aren't dirty, you may use an alcohol hand-rub to clean your hands. Keep your nails clean and short.

## 2019-08-05 NOTE — PLAN OF CARE
Data: Vital signs within normal limits. Postpartum checks within normal limits - see flow record. Patient eating and drinking normally. Patient able to empty bladder independently and is up ambulating. No apparent signs of infection.  Patient performing self cares and is able to care for infant.  Action: Patient medicated during the shift with ibuprofen for cramping. See MAR. Patient reassessed within 1 hour after each medication and pain was improved - patient stated she was comfortable. Patient education completed. See flow record.  Response: Positive attachment behaviors observed with infant. Father of infant present and attentive to both mother and infant needs.   Plan: Discharge to home today at 12:40 with all patient belongings. AVS read to patient. All questions and concerns addressed.

## 2019-08-05 NOTE — PROGRESS NOTES
[facility]    Post-Partum Progress Note    Assessment & Plan   Assessment:  Post-partum day #2  Normal spontaneous vaginal delivery    Doing well.  No excessive bleeding  Pain well-controlled.    Plan:  Ambulation encouraged  Breast feeding strategies discussed  Pain control measures as needed  Discharge later today  F/up in office in 6 weeks.    Gian Fernandes MD    Interval History   Doing well.  Pain is adequately controlled.  No fevers.  No history of foul-smelling vaginal discharge.  Good appetite.  Denies chest pain, shortness of breath, nausea or vomiting.  Vaginal bleeding is similar to a heavy menstrual flow.  Breastfeeding well.    Medications     - MEDICATION INSTRUCTIONS -       NO Rho (D) immune globulin (RhoGam) needed - mother Rh POSITIVE       - MEDICATION INSTRUCTIONS -       oxytocin in 0.9% NaCl       oxytocin in 0.9% NaCl         senna-docusate  1 tablet Oral BID    Or     senna-docusate  2 tablet Oral BID       Physical Exam   Temp: 98.4  F (36.9  C) Temp src: Oral BP: 90/54 Pulse: 57   Resp: 16        There were no vitals filed for this visit.  Vital Signs with Ranges  Temp:  [98.4  F (36.9  C)-98.7  F (37.1  C)] 98.4  F (36.9  C)  Pulse:  [57-70] 57  Resp:  [16-18] 16  BP: ()/(54-61) 90/54  No intake/output data recorded.    Uterine fundus is firm, non-tender and at the level of the umbilicus  Extremities Non-tender  Perineal sutures intact and wound healing well  Lochia healthy    Data   Recent Labs   Lab Test 08/03/19  0939   ABO O   RH Pos     Recent Labs   Lab Test 08/04/19  0635 08/03/19  0939   HGB 11.7 11.7     Recent Labs   Lab Test 08/10/15   RUQIGG immune

## 2019-08-05 NOTE — PLAN OF CARE
Independent in care of self and . VS stable. Breast feeding and formula feeding. Bonding well with infant. Able to ambulate and void on her own. FOB at bs and supportive with care.

## 2019-08-05 NOTE — PROGRESS NOTES
Public Health Nurse (PHN) spoke with patient regarding [facility] services and resources.  Family provided [facility] Public  Health resources handout, home visiting card, follow along card and  car seat installation resources card given and discussed. Patient accepted referral for home visiting services, Eric Warning given, referral completed electronically. Patient declined any questions or concerns.

## 2019-08-05 NOTE — PLAN OF CARE
Independent in care of self and . VS stable. Pain well controlled with ibuprofen and tylenol. Breast feeding and supplementing with formula well. Ambulating and voiding on her own. FOB at bs and supportive with care.

## 2021-01-13 ENCOUNTER — HOSPITAL ENCOUNTER (INPATIENT)
Facility: CLINIC | Age: 26
LOS: 3 days | Discharge: HOME | End: 2021-01-16
Attending: OBSTETRICS & GYNECOLOGY | Admitting: OBSTETRICS & GYNECOLOGY
Payer: COMMERCIAL

## 2021-01-13 PROBLEM — Z36.89 ENCOUNTER FOR TRIAGE IN PREGNANT PATIENT: Status: ACTIVE | Noted: 2019-08-03

## 2021-01-13 LAB
ALBUMIN UR-MCNC: NEGATIVE MG/DL
APPEARANCE UR: CLEAR
BACTERIA #/AREA URNS HPF: ABNORMAL /HPF
BILIRUB UR QL STRIP: NEGATIVE
COLOR UR AUTO: ABNORMAL
FERN CRYSTALLIZATION: NORMAL
GLUCOSE UR STRIP-MCNC: NEGATIVE MG/DL
HGB UR QL STRIP: NEGATIVE
KETONES UR STRIP-MCNC: 10 MG/DL
LEUKOCYTE ESTERASE UR QL STRIP: ABNORMAL
MUCOUS THREADS #/AREA URNS LPF: PRESENT /LPF
NITRATE UR QL: NEGATIVE
PH UR STRIP: 5.5 PH (ref 5–7)
RBC #/AREA URNS AUTO: 1 /HPF (ref 0–2)
RUPTURE OF FETAL MEMBRANES BY ROM PLUS: POSITIVE
SOURCE: ABNORMAL
SP GR UR STRIP: 1.01 (ref 1–1.03)
SPECIMEN SOURCE: NORMAL
SQUAMOUS #/AREA URNS AUTO: 2 /HPF (ref 0–1)
UROBILINOGEN UR STRIP-MCNC: NORMAL MG/DL (ref 0–2)
WBC #/AREA URNS AUTO: 9 /HPF (ref 0–5)
WET PREP SPEC: NORMAL

## 2021-01-13 PROCEDURE — 87635 SARS-COV-2 COVID-19 AMP PRB: CPT | Performed by: OBSTETRICS & GYNECOLOGY

## 2021-01-13 PROCEDURE — 87210 SMEAR WET MOUNT SALINE/INK: CPT | Performed by: OBSTETRICS & GYNECOLOGY

## 2021-01-13 PROCEDURE — 84112 EVAL AMNIOTIC FLUID PROTEIN: CPT | Performed by: OBSTETRICS & GYNECOLOGY

## 2021-01-13 PROCEDURE — 80307 DRUG TEST PRSMV CHEM ANLYZR: CPT | Performed by: OBSTETRICS & GYNECOLOGY

## 2021-01-13 PROCEDURE — 120N000001 HC R&B MED SURG/OB

## 2021-01-13 PROCEDURE — 87086 URINE CULTURE/COLONY COUNT: CPT | Performed by: OBSTETRICS & GYNECOLOGY

## 2021-01-13 PROCEDURE — G0463 HOSPITAL OUTPT CLINIC VISIT: HCPCS

## 2021-01-13 PROCEDURE — 87653 STREP B DNA AMP PROBE: CPT | Performed by: OBSTETRICS & GYNECOLOGY

## 2021-01-13 PROCEDURE — 81001 URINALYSIS AUTO W/SCOPE: CPT | Performed by: OBSTETRICS & GYNECOLOGY

## 2021-01-13 RX ORDER — SODIUM CHLORIDE, SODIUM LACTATE, POTASSIUM CHLORIDE, CALCIUM CHLORIDE 600; 310; 30; 20 MG/100ML; MG/100ML; MG/100ML; MG/100ML
INJECTION, SOLUTION INTRAVENOUS CONTINUOUS
Status: DISCONTINUED | OUTPATIENT
Start: 2021-01-13 | End: 2021-01-14

## 2021-01-13 RX ORDER — AZITHROMYCIN 250 MG/1
1000 TABLET, FILM COATED ORAL ONCE
Status: COMPLETED | OUTPATIENT
Start: 2021-01-14 | End: 2021-01-14

## 2021-01-13 RX ORDER — BETAMETHASONE SODIUM PHOSPHATE AND BETAMETHASONE ACETATE 3; 3 MG/ML; MG/ML
12 INJECTION, SUSPENSION INTRA-ARTICULAR; INTRALESIONAL; INTRAMUSCULAR; SOFT TISSUE EVERY 24 HOURS
Status: DISCONTINUED | OUTPATIENT
Start: 2021-01-13 | End: 2021-01-14

## 2021-01-13 RX ORDER — AMPICILLIN 2 G/1
2 INJECTION, POWDER, FOR SOLUTION INTRAVENOUS EVERY 6 HOURS
Status: DISCONTINUED | OUTPATIENT
Start: 2021-01-13 | End: 2021-01-14

## 2021-01-14 LAB
ABO + RH BLD: NORMAL
ABO + RH BLD: NORMAL
AMPHETAMINES UR QL SCN: NEGATIVE
CANNABINOIDS UR QL: NEGATIVE
COCAINE UR QL: NEGATIVE
GP B STREP DNA SPEC QL NAA+PROBE: NEGATIVE
HGB BLD-MCNC: 11.4 G/DL (ref 11.7–15.7)
LABORATORY COMMENT REPORT: NORMAL
OPIATES UR QL SCN: NEGATIVE
PCP UR QL SCN: NEGATIVE
SARS-COV-2 RNA RESP QL NAA+PROBE: NEGATIVE
SPECIMEN EXP DATE BLD: NORMAL
SPECIMEN SOURCE: NORMAL
SPECIMEN SOURCE: NORMAL
T PALLIDUM AB SER QL: NONREACTIVE

## 2021-01-14 PROCEDURE — 120N000001 HC R&B MED SURG/OB

## 2021-01-14 PROCEDURE — 99207 PR CONSULT E&M CHANGED TO INITIAL LEVEL: CPT | Performed by: NURSE PRACTITIONER

## 2021-01-14 PROCEDURE — 86901 BLOOD TYPING SEROLOGIC RH(D): CPT | Performed by: OBSTETRICS & GYNECOLOGY

## 2021-01-14 PROCEDURE — 258N000003 HC RX IP 258 OP 636: Performed by: OBSTETRICS & GYNECOLOGY

## 2021-01-14 PROCEDURE — 250N000011 HC RX IP 250 OP 636: Performed by: OBSTETRICS & GYNECOLOGY

## 2021-01-14 PROCEDURE — 88307 TISSUE EXAM BY PATHOLOGIST: CPT | Mod: 26 | Performed by: PATHOLOGY

## 2021-01-14 PROCEDURE — 36415 COLL VENOUS BLD VENIPUNCTURE: CPT | Performed by: OBSTETRICS & GYNECOLOGY

## 2021-01-14 PROCEDURE — 250N000013 HC RX MED GY IP 250 OP 250 PS 637: Performed by: OBSTETRICS & GYNECOLOGY

## 2021-01-14 PROCEDURE — 85018 HEMOGLOBIN: CPT | Performed by: OBSTETRICS & GYNECOLOGY

## 2021-01-14 PROCEDURE — 86900 BLOOD TYPING SEROLOGIC ABO: CPT | Performed by: OBSTETRICS & GYNECOLOGY

## 2021-01-14 PROCEDURE — 99221 1ST HOSP IP/OBS SF/LOW 40: CPT | Performed by: NURSE PRACTITIONER

## 2021-01-14 PROCEDURE — 250N000009 HC RX 250: Performed by: OBSTETRICS & GYNECOLOGY

## 2021-01-14 PROCEDURE — 722N000001 HC LABOR CARE VAGINAL DELIVERY SINGLE

## 2021-01-14 PROCEDURE — 88307 TISSUE EXAM BY PATHOLOGIST: CPT | Mod: TC | Performed by: OBSTETRICS & GYNECOLOGY

## 2021-01-14 PROCEDURE — 86780 TREPONEMA PALLIDUM: CPT | Performed by: OBSTETRICS & GYNECOLOGY

## 2021-01-14 RX ORDER — ACETAMINOPHEN 325 MG/1
650 TABLET ORAL EVERY 4 HOURS PRN
Status: DISCONTINUED | OUTPATIENT
Start: 2021-01-14 | End: 2021-01-16 | Stop reason: HOSPADM

## 2021-01-14 RX ORDER — ONDANSETRON 2 MG/ML
4 INJECTION INTRAMUSCULAR; INTRAVENOUS EVERY 6 HOURS PRN
Status: DISCONTINUED | OUTPATIENT
Start: 2021-01-14 | End: 2021-01-14

## 2021-01-14 RX ORDER — BISACODYL 10 MG
10 SUPPOSITORY, RECTAL RECTAL DAILY PRN
Status: DISCONTINUED | OUTPATIENT
Start: 2021-01-16 | End: 2021-01-16 | Stop reason: HOSPADM

## 2021-01-14 RX ORDER — TRANEXAMIC ACID 10 MG/ML
1 INJECTION, SOLUTION INTRAVENOUS EVERY 30 MIN PRN
Status: DISCONTINUED | OUTPATIENT
Start: 2021-01-14 | End: 2021-01-14

## 2021-01-14 RX ORDER — FENTANYL CITRATE 50 UG/ML
100 INJECTION, SOLUTION INTRAMUSCULAR; INTRAVENOUS ONCE
Status: COMPLETED | OUTPATIENT
Start: 2021-01-14 | End: 2021-01-14

## 2021-01-14 RX ORDER — NALOXONE HYDROCHLORIDE 0.4 MG/ML
0.2 INJECTION, SOLUTION INTRAMUSCULAR; INTRAVENOUS; SUBCUTANEOUS
Status: DISCONTINUED | OUTPATIENT
Start: 2021-01-14 | End: 2021-01-14

## 2021-01-14 RX ORDER — SODIUM CHLORIDE, SODIUM LACTATE, POTASSIUM CHLORIDE, CALCIUM CHLORIDE 600; 310; 30; 20 MG/100ML; MG/100ML; MG/100ML; MG/100ML
INJECTION, SOLUTION INTRAVENOUS CONTINUOUS
Status: DISCONTINUED | OUTPATIENT
Start: 2021-01-14 | End: 2021-01-14

## 2021-01-14 RX ORDER — OXYTOCIN 10 [USP'U]/ML
10 INJECTION, SOLUTION INTRAMUSCULAR; INTRAVENOUS
Status: DISCONTINUED | OUTPATIENT
Start: 2021-01-14 | End: 2021-01-16 | Stop reason: HOSPADM

## 2021-01-14 RX ORDER — AMOXICILLIN 250 MG
2 CAPSULE ORAL 2 TIMES DAILY
Status: DISCONTINUED | OUTPATIENT
Start: 2021-01-14 | End: 2021-01-16 | Stop reason: HOSPADM

## 2021-01-14 RX ORDER — NALOXONE HYDROCHLORIDE 0.4 MG/ML
0.4 INJECTION, SOLUTION INTRAMUSCULAR; INTRAVENOUS; SUBCUTANEOUS
Status: DISCONTINUED | OUTPATIENT
Start: 2021-01-14 | End: 2021-01-14

## 2021-01-14 RX ORDER — OXYTOCIN/0.9 % SODIUM CHLORIDE 30/500 ML
1-24 PLASTIC BAG, INJECTION (ML) INTRAVENOUS CONTINUOUS
Status: DISCONTINUED | OUTPATIENT
Start: 2021-01-14 | End: 2021-01-14

## 2021-01-14 RX ORDER — FLUCONAZOLE 150 MG/1
150 TABLET ORAL ONCE
Status: COMPLETED | OUTPATIENT
Start: 2021-01-14 | End: 2021-01-14

## 2021-01-14 RX ORDER — OXYTOCIN/0.9 % SODIUM CHLORIDE 30/500 ML
100-340 PLASTIC BAG, INJECTION (ML) INTRAVENOUS CONTINUOUS PRN
Status: COMPLETED | OUTPATIENT
Start: 2021-01-14 | End: 2021-01-14

## 2021-01-14 RX ORDER — METHYLERGONOVINE MALEATE 0.2 MG/ML
200 INJECTION INTRAVENOUS
Status: DISCONTINUED | OUTPATIENT
Start: 2021-01-14 | End: 2021-01-14

## 2021-01-14 RX ORDER — IBUPROFEN 800 MG/1
800 TABLET, FILM COATED ORAL
Status: COMPLETED | OUTPATIENT
Start: 2021-01-14 | End: 2021-01-14

## 2021-01-14 RX ORDER — OXYTOCIN/0.9 % SODIUM CHLORIDE 30/500 ML
100 PLASTIC BAG, INJECTION (ML) INTRAVENOUS CONTINUOUS
Status: DISCONTINUED | OUTPATIENT
Start: 2021-01-14 | End: 2021-01-16 | Stop reason: HOSPADM

## 2021-01-14 RX ORDER — OXYTOCIN 10 [USP'U]/ML
10 INJECTION, SOLUTION INTRAMUSCULAR; INTRAVENOUS
Status: DISCONTINUED | OUTPATIENT
Start: 2021-01-14 | End: 2021-01-14

## 2021-01-14 RX ORDER — HYDROCORTISONE 2.5 %
CREAM (GRAM) TOPICAL 3 TIMES DAILY PRN
Status: DISCONTINUED | OUTPATIENT
Start: 2021-01-14 | End: 2021-01-16 | Stop reason: HOSPADM

## 2021-01-14 RX ORDER — TRANEXAMIC ACID 10 MG/ML
1 INJECTION, SOLUTION INTRAVENOUS EVERY 30 MIN PRN
Status: DISCONTINUED | OUTPATIENT
Start: 2021-01-14 | End: 2021-01-16 | Stop reason: HOSPADM

## 2021-01-14 RX ORDER — OXYTOCIN/0.9 % SODIUM CHLORIDE 30/500 ML
340 PLASTIC BAG, INJECTION (ML) INTRAVENOUS CONTINUOUS PRN
Status: DISCONTINUED | OUTPATIENT
Start: 2021-01-14 | End: 2021-01-16 | Stop reason: HOSPADM

## 2021-01-14 RX ORDER — AMOXICILLIN 250 MG
1 CAPSULE ORAL 2 TIMES DAILY
Status: DISCONTINUED | OUTPATIENT
Start: 2021-01-14 | End: 2021-01-16 | Stop reason: HOSPADM

## 2021-01-14 RX ORDER — PENICILLIN G POTASSIUM 5000000 [IU]/1
5 INJECTION, POWDER, FOR SOLUTION INTRAMUSCULAR; INTRAVENOUS ONCE
Status: COMPLETED | OUTPATIENT
Start: 2021-01-14 | End: 2021-01-14

## 2021-01-14 RX ORDER — ACETAMINOPHEN 325 MG/1
650 TABLET ORAL EVERY 4 HOURS PRN
Status: DISCONTINUED | OUTPATIENT
Start: 2021-01-14 | End: 2021-01-14

## 2021-01-14 RX ORDER — CARBOPROST TROMETHAMINE 250 UG/ML
250 INJECTION, SOLUTION INTRAMUSCULAR
Status: DISCONTINUED | OUTPATIENT
Start: 2021-01-14 | End: 2021-01-14

## 2021-01-14 RX ORDER — MODIFIED LANOLIN
OINTMENT (GRAM) TOPICAL
Status: DISCONTINUED | OUTPATIENT
Start: 2021-01-14 | End: 2021-01-16 | Stop reason: HOSPADM

## 2021-01-14 RX ORDER — LIDOCAINE 40 MG/G
CREAM TOPICAL
Status: DISCONTINUED | OUTPATIENT
Start: 2021-01-14 | End: 2021-01-14

## 2021-01-14 RX ORDER — IBUPROFEN 800 MG/1
800 TABLET, FILM COATED ORAL EVERY 6 HOURS PRN
Status: DISCONTINUED | OUTPATIENT
Start: 2021-01-14 | End: 2021-01-16 | Stop reason: HOSPADM

## 2021-01-14 RX ORDER — OXYCODONE AND ACETAMINOPHEN 5; 325 MG/1; MG/1
1 TABLET ORAL
Status: DISCONTINUED | OUTPATIENT
Start: 2021-01-14 | End: 2021-01-14

## 2021-01-14 RX ADMIN — FENTANYL CITRATE 100 MCG: 50 INJECTION, SOLUTION INTRAMUSCULAR; INTRAVENOUS at 19:42

## 2021-01-14 RX ADMIN — IBUPROFEN 800 MG: 800 TABLET, FILM COATED ORAL at 20:19

## 2021-01-14 RX ADMIN — AMPICILLIN SODIUM 2 G: 2 INJECTION, POWDER, FOR SOLUTION INTRAMUSCULAR; INTRAVENOUS at 00:14

## 2021-01-14 RX ADMIN — SODIUM CHLORIDE, POTASSIUM CHLORIDE, SODIUM LACTATE AND CALCIUM CHLORIDE: 600; 310; 30; 20 INJECTION, SOLUTION INTRAVENOUS at 15:55

## 2021-01-14 RX ADMIN — AZITHROMYCIN MONOHYDRATE 1000 MG: 250 TABLET ORAL at 00:14

## 2021-01-14 RX ADMIN — Medication 340 ML/HR: at 19:28

## 2021-01-14 RX ADMIN — AMPICILLIN SODIUM 2 G: 2 INJECTION, POWDER, FOR SOLUTION INTRAMUSCULAR; INTRAVENOUS at 06:26

## 2021-01-14 RX ADMIN — FLUCONAZOLE 150 MG: 150 TABLET ORAL at 11:57

## 2021-01-14 RX ADMIN — SODIUM CHLORIDE 2.5 MILLION UNITS: 9 INJECTION, SOLUTION INTRAVENOUS at 17:09

## 2021-01-14 RX ADMIN — ACETAMINOPHEN 650 MG: 325 TABLET, FILM COATED ORAL at 14:29

## 2021-01-14 RX ADMIN — SODIUM CHLORIDE, POTASSIUM CHLORIDE, SODIUM LACTATE AND CALCIUM CHLORIDE: 600; 310; 30; 20 INJECTION, SOLUTION INTRAVENOUS at 07:16

## 2021-01-14 RX ADMIN — BETAMETHASONE SODIUM PHOSPHATE AND BETAMETHASONE ACETATE 12 MG: 3; 3 INJECTION, SUSPENSION INTRA-ARTICULAR; INTRALESIONAL; INTRAMUSCULAR at 00:15

## 2021-01-14 RX ADMIN — Medication 2 MILLI-UNITS/MIN: at 13:21

## 2021-01-14 RX ADMIN — SODIUM CHLORIDE, POTASSIUM CHLORIDE, SODIUM LACTATE AND CALCIUM CHLORIDE: 600; 310; 30; 20 INJECTION, SOLUTION INTRAVENOUS at 00:27

## 2021-01-14 RX ADMIN — PENICILLIN G POTASSIUM 5 MILLION UNITS: 5000000 POWDER, FOR SOLUTION INTRAMUSCULAR; INTRAPLEURAL; INTRATHECAL; INTRAVENOUS at 13:21

## 2021-01-14 NOTE — CONSULTS
[facility]     2021  Neonatology Antepartum Counseling Consult  2:34 PM           I was asked to provide antepartum counseling for Juliana Rodriguez at the request of Dana Mckeon MD secondary to SROM and induction.       Ms. Juliana Rodriguez is currently 34w0d weeks and has a hx significant for:    Patient Active Problem List   Diagnosis     Acne     Dysmenorrhea     Venereal warts     Indication for care in labor and delivery, antepartum      (spontaneous vaginal delivery)     Encounter for triage in pregnant patient     Uterine contractions during pregnancy     Indication for care in labor or delivery       Betamethasone one dose was administered on 21.          Ms. Juliana Rodriguez, accompanied by her , were counseled on the expected hospital course, potential risks, and outcomes associated with an infant born at approximately 34w0d weeks gestation. The counseling included: morbidity, mortality, initial delivery room stabilization, respiratory course, lung development, patent ductus arteriosus, retinopathy of prematurity, hyperbilirubinemia, hemodynamic support, infection (including NEC), intraventricular hemorrhage, nutrition, growth and development, and long term outcomes.  I also explained the basic four criteria for discharge: that the baby had to be free of apnea; able to maintain their body temperature; able to feed by bottle or breast well enough to; attain an adequate pattern of weight gain and growth.         The neonatology team appreciates this consult, and the opportunity to be involved in the care of this new family.  Please feel free to call us with any additional questions or concerns.      Floor Time (min): 40  Face to Face Time (min): 30  Total Time (minutes): 40  More than 50% of my time was spent in direct, face to face, antepartum counseling with the above patient.    Wilfredo FARMER CNNP MSN 2:34 PM, 2021

## 2021-01-14 NOTE — PROVIDER NOTIFICATION
01/14/21 1359   Provider Notification   Provider Name/Title Dr Mckeon   Method of Notification Electronic Page   Juliana Ali 409  Pitocin infusing, feeling contractions every 2- 4 min.    Current Cat 1 FHR tracing.  Wanted to update you that pt pulse is elevated , afebrile.

## 2021-01-14 NOTE — PROGRESS NOTES
Pt reports more discomfort with contractions.  No fever or abdominal pain.  Continues to leak clear fluid; no change in discharge.    Afebrile, HR 98, 110, 112  FHT: 135/mod/+accel/+decel (nonreccurent early; late x1)  [place]: q 3-5 min  Abd: nontender  SVE: /-1    ASSESSMENT/PLAN:  24yo  at 34+0 wga with PPROM at 33+6 ( at ~0000)  - PPROM: Maternal tachycardia noted, but no fevers, purulent discharge, fundal tenderness, or fetal tachycardia. No concern for triple I at this time; cont to monitor.   - Continue pitocin for IOL              - s/p BMZ #1 at 0015 on    - s/p NICU consult  - FHT reactive: cont CEFM  - h/o PTD in setting of cervical insufficiency: she declined cerclage this pregnancy.  She has been using vaginal progesterone since 2nd tri  - O pos  - GBS pending: cont PCN  - Anticipate

## 2021-01-14 NOTE — PROVIDER NOTIFICATION
01/14/21 1502   Provider Notification   Provider Name/Title Dr Mckeon   Method of Notification At Bedside   Request Evaluate in Person   Notification Reason Uterine Activity;Pain;Status Update;SVE   reviewed FHR tracing, recurrent late decels. Position to left lateral.  SVE done, change noted.

## 2021-01-14 NOTE — PROVIDER NOTIFICATION
01/13/21 6947   Provider Notification   Provider Name/Title Dr. Sanchez   Method of Notification Phone     MD notified that ROM plus came back positive. MD to come do a bedside ultrasound and discuss POC.

## 2021-01-14 NOTE — PROVIDER NOTIFICATION
01/14/21 1321   Fetal Assessment   Fetal HR Assessment Method external US   Fetal HR (beats/min) 125   Fetal Heart Baseline Rate normal range   Fetal HR Variability moderate (amplitude range 6 to 25 bpm)   Fetal HR Accelerations present   Fetal HR Decelerations variable  (7809-2269 lasting 110 seconds)   resolved with position change. Educated pt to avoid high fowlers if possible.

## 2021-01-14 NOTE — PROVIDER NOTIFICATION
21 2633   Provider Notification   Provider Name/Title Dr. Sanchez   Method of Notification In Department     RN to put in orders. Verbal order from MD for  labor orderset, ampicillin 2g IV q6h, zithromax 1g PO once, betamethasone 12g IM once now and again in 24 hours, and LR 125ml/hr continuous. Will also collect a wet prep, fern, GBS swab, covid swab, UA and urine tox.

## 2021-01-14 NOTE — PROVIDER NOTIFICATION
01/13/21 4347   Provider Notification   Provider Name/Title Dr. Sanchez   Method of Notification At Bedside     MD at bedside to discuss POC. Plan to keep patient to observe labor pattern, do a fern test to verify ROM plus result, give IV fluids and antibiotics. MD to put in orders.

## 2021-01-14 NOTE — H&P
"  2021    Juliana Rodriguez  [id number]    OB Admit History & Physical      Ms. Rodriguez  is a 25 year old  having one term pregnancy, one 36 week delivery and one 20+ week loss.  She has been on progesterone though this pregnancy and states she had SROM yesterday evening.  Mom is ruthann q 3 to 5 minutes apart.  ROM+ was positive but discharge was thick and no significant sign of fluid.  Fern and GBS is being done.      No LMP recorded. Patient is pregnant.   Her Estimated Date of Delivery: 2021  , making her 33w6d  wks.      Estimated body mass index is 21.97 kg/m  as calculated from the following:    Height as of 18: 1.6 m (5' 3\").    Weight as of 18: 56.2 kg (124 lb).  Her prenatal course has been complicated by previous  delivery and previous second trimester loss.    See prenatal for labs.  pending GBBS    Estimated fetal weight= 4 pounds       She is a 25 year old   Her OB history:   OB History    Para Term  AB Living   5 2 1 1 1 2   SAB TAB Ectopic Multiple Live Births   1 0 0 0 2      # Outcome Date GA Lbr Bunny/2nd Weight Sex Delivery Anes PTL Lv   5 Current            4 Term 03 40w3d 04:31 / 00:26 3.48 kg (7 lb 10.8 oz) F Vag-Spont None N SABA      Name: ROB,FEMALE-JULIANA      Apgar1: 8  Apgar5: 9   3  16 35w3d 06:50 / 00:23 2.631 kg (5 lb 12.8 oz) F Vag-Spont Local Y SABA      Apgar1: 8  Apgar5: 9   2 SAB 13     SAB      1                    History reviewed. No pertinent past medical history.     History reviewed. No pertinent surgical history.      No current outpatient medications on file.       Allergies: Influenza vaccine live      REVIEW OF SYSTEMS:  NEUROLOGIC:  Negative  EYES:  Negative  ENT:  Negative  GI:  Negative  BREAST:  Negative  :  Negative  GYN:  Negative  CV:  Negative  PULMONARY:  Negative  MUSCULOSKELETAL:  Negative  PSYCH:  Negative        Social History     Socioeconomic History     Marital status: "      Spouse name: Not on file     Number of children: Not on file     Years of education: Not on file     Highest education level: Not on file   Occupational History     Not on file   Social Needs     Financial resource strain: Not on file     Food insecurity     Worry: Not on file     Inability: Not on file     Transportation needs     Medical: Not on file     Non-medical: Not on file   Tobacco Use     Smoking status: Never Smoker     Smokeless tobacco: Never Used   Substance and Sexual Activity     Alcohol use: No     Drug use: No     Sexual activity: Yes     Partners: Male     Comment: 05/12/2008 jennifer capellan md,janet 1/12/09   Lifestyle     Physical activity     Days per week: Not on file     Minutes per session: Not on file     Stress: Not on file   Relationships     Social connections     Talks on phone: Not on file     Gets together: Not on file     Attends Rastafarian service: Not on file     Active member of club or organization: Not on file     Attends meetings of clubs or organizations: Not on file     Relationship status: Not on file     Intimate partner violence     Fear of current or ex partner: Not on file     Emotionally abused: Not on file     Physically abused: Not on file     Forced sexual activity: Not on file   Other Topics Concern     Parent/sibling w/ CABG, MI or angioplasty before 65F 55M? Not Asked   Social History Narrative     Not on file      Family History   Problem Relation Age of Onset     Unknown/Adopted Maternal Grandmother      Unknown/Adopted Maternal Grandfather      Unknown/Adopted Paternal Grandmother      Unknown/Adopted Paternal Grandfather      Unknown/Adopted Mother      Unknown/Adopted Father      Unknown/Adopted Brother      Unknown/Adopted Sister              Vitals:      With contractions every  3 to 5 min    Alert Awake in NAD  HEENT grossly normal  Neck: no lymphadenopathy or thryoidomegaly  Lungs CTA  Back normal spinal or CVAT  Heart RRR  ABD gravid, Vertex      Bedside US confirms vertex with 14 cm total FREDY    Pelvic:  no fluid noted, no blood noted  Cervix   EXT:  no edema or calf tenderness  Neuro:  intact    Assessment/Plan    IUP at 33w6d    Questionable SROM, will recheck with fern  Admit and observer.  If confirm SROM consider induction tomorrow  Will give first shot of steroids tonight.  Start Amp 2 gram q6 hour,  Azithromycin 1 gram  Stop progesterone        [unfilled]      Jony Sanchez MD  Dept of OB/GYN  January 13, 2021

## 2021-01-14 NOTE — PROVIDER NOTIFICATION
01/14/21 1101   Fetal Assessment   Fetal HR Assessment Method external US   Fetal HR (beats/min) 125   Fetal Heart Baseline Rate normal range   Fetal HR Variability moderate (amplitude range 6 to 25 bpm)   Fetal HR Accelerations present   Fetal HR Decelerations other (see comments)  ([id number] sitting high fowlers. )   when sitting high fowlers eating FHR decels with nadar in 90's. Tracing broken. Position changed. FHR back to baseline.

## 2021-01-14 NOTE — PROGRESS NOTES
Patient Name:  Juliana Rodriguez   MRN:  [id number]  Age:  25 year old    YOB: 1995      GA: 34w0d  GP:   ERNESTINA: 2021, by Patient Reported    SUBJECTIVE:  Patient is doing well.  She reports not feeling contractions.  She continues to have LOF, but is clear.  No VB.  No abnormal discharge or abdominal pain.  +FM.      OBJECTIVE:  Vital signs in last 24 hours:  Temp:  [97.9  F (36.6  C)-98.5  F (36.9  C)] 98  F (36.7  C)  Resp:  [16-18] 18  BP: ()/(53-64) 104/60  0 lbs 0 oz    Fetal Monitoring:  Baseline FHR: 130 per minute  Fetal heart tones:  Category I  Fetal heart variability: moderate  Fetal heart rate accelerations: present  Fetal heart rate decelerations: none    Maternal Monitoring:  Uterine contractions: irregular - q 5-15 mins  SVE: deferred - FT in office on            ASSESSMENT/PLAN:  24yo  at 34+0 wga with PPROM at 33+6 ( at ~0000)  - PPROM:    - Cont PPROM antibiotics. No s/sx of triple I; will cont to monitor for symptoms   - Cephalic on BSUS upon admission   - s/p BMZ #1 at 0015 on    - We discussed management options. Given her gestational age, expectant management and IOL are both reasonable options.  We reviewed that spontaneous labor typically ensues within 24-48 hours.  We discussed the benefit of waiting - ie, to obtain the most benefit from BMZ for FLM.  We discussed the risks of PTL and infection, and if these occur, then we would move toward IOL/delivery. Additionally, if there are signs of fetal distress (FHT decels), we would also move towards IOL at that time.  She understands all that was discussed; she and her  would like to discuss their options.  - FHT reactive: cont CEFM  - h/o PTD in setting of cervical insufficiency: she declined cerclage this pregnancy.  She has been using vaginal progesterone since 2nd tri  - O pos  - GBS collected

## 2021-01-14 NOTE — PROVIDER NOTIFICATION
01/14/21 0938   Fetal Assessment   Fetal HR Assessment Method external US   Fetal HR (beats/min) 125   Fetal Heart Baseline Rate normal range   Fetal HR Variability moderate (amplitude range 6 to 25 bpm)   Fetal HR Accelerations absent   Fetal HR Decelerations prolonged   [id number] prolonged decel lasting 120 seconds nadar 105's. Pt sat in high fowlers, lowered to semi fowlers and FHR back to normal baseline 125.

## 2021-01-14 NOTE — PROVIDER NOTIFICATION
"   21   Provider Notification   Provider Name/Title Dr. Sanchez   Method of Notification In Department   Notification Reason Patient Arrived     MD notified of patient arrival. History reviewed with MD; patient has had a  delivery at 35 weeks, an IUFD at 21 weeks, & takes progesterone suppositories for  labor. Patient reported feeling leaking of fluid fairly steadily since 0100 this morning, noting it to be a yellow/green color. Patient has had the same kind of discharge on and off since starting progesterone, but said \"this is different; more than before.\" Minimal variability noted at the beginning of the tracing, but has since changed to moderate with accels. Patient denies feeling contractions; toco picking up cx's about every 3-6 minutes. Patient then said she sometimes felt tightening. RN did not note any fluid while collecting ROM plus, but will update MD with results.    "

## 2021-01-14 NOTE — PROGRESS NOTES
Pt reports feeling more pressure.  No fever or abdominal pain.  Continues to leak clear fluid; no change in discharge.     Afebrile  FHT: 125/mod/+accel/+decel (nonreccurent early)  Winner: q 3 min  Abd: nontender  SVE: 5/80/-1  Forebag palpated, but patient declined AROM     ASSESSMENT/PLAN:  26yo  at 34+0 wga with PPROM at 33+6 ( at ~0000)  - PPROM: No concern for triple I at this time; cont to monitor.              - Continue pitocin for IOL. AROM with next exam if patient amenable              - s/p BMZ #1 at 0015 on               - s/p NICU consult  - FHT reactive: cont CEFM  - h/o PTD in setting of cervical insufficiency: she declined cerclage this pregnancy.  She has been using vaginal progesterone since  tri  - O pos  - GBS still pending: cont PCN  - Anticipate

## 2021-01-14 NOTE — PROVIDER NOTIFICATION
01/14/21 0825   Provider Notification   Provider Name/Title Dr Mckeon   Method of Notification Phone   Request Evaluate - Remote   Notification Reason Labor Status;Uterine Activity;Pain;Status Update   Status update. Feels occasional pressure in back. Leaking clear fluid. Normal fetal movement. Cat 1 strip. MD will round shortly.

## 2021-01-14 NOTE — PROGRESS NOTES
Update from nurse: Patient and  have decided to proceed with IOL.  She would like to delay this until the afternoon, after her  tends to their other children.  Also, notified of 2min FHR decel when patient sat up; FHR recovered after position change.     Plan:  Will start pitocin for IOL this afternoon, or sooner for maternal/fetal indications.

## 2021-01-14 NOTE — PROVIDER NOTIFICATION
01/14/21 1037   Provider Notification   Provider Name/Title Dr Mckeon   Method of Notification In Department   Request Evaluate in Person   Notification Reason Status Update   Dr Mckeon at bedside to discuss options for treatment. IOL and expected management reviewed. Pt and  discussed. Decided they would like to move forward with IOL.  is going to make a trip home and pt would like to start once  returned. MD agreeable with this plan. Will check cervix and start Pitocin at that time.

## 2021-01-15 LAB
BACTERIA SPEC CULT: NO GROWTH
HGB BLD-MCNC: 10.5 G/DL (ref 11.7–15.7)
Lab: NORMAL
SPECIMEN SOURCE: NORMAL

## 2021-01-15 PROCEDURE — 120N000001 HC R&B MED SURG/OB

## 2021-01-15 PROCEDURE — 250N000013 HC RX MED GY IP 250 OP 250 PS 637: Performed by: OBSTETRICS & GYNECOLOGY

## 2021-01-15 PROCEDURE — 85018 HEMOGLOBIN: CPT | Performed by: OBSTETRICS & GYNECOLOGY

## 2021-01-15 PROCEDURE — 36415 COLL VENOUS BLD VENIPUNCTURE: CPT | Performed by: OBSTETRICS & GYNECOLOGY

## 2021-01-15 RX ORDER — FERROUS SULFATE 325(65) MG
325 TABLET ORAL DAILY
Status: DISCONTINUED | OUTPATIENT
Start: 2021-01-15 | End: 2021-01-16 | Stop reason: HOSPADM

## 2021-01-15 RX ADMIN — IBUPROFEN 800 MG: 800 TABLET ORAL at 18:29

## 2021-01-15 RX ADMIN — FERROUS SULFATE TAB 325 MG (65 MG ELEMENTAL FE) 325 MG: 325 (65 FE) TAB at 21:10

## 2021-01-15 RX ADMIN — DOCUSATE SODIUM 50 MG AND SENNOSIDES 8.6 MG 2 TABLET: 8.6; 5 TABLET, FILM COATED ORAL at 21:10

## 2021-01-15 RX ADMIN — ACETAMINOPHEN 650 MG: 325 TABLET, FILM COATED ORAL at 06:17

## 2021-01-15 RX ADMIN — IBUPROFEN 800 MG: 800 TABLET ORAL at 04:57

## 2021-01-15 RX ADMIN — ACETAMINOPHEN 325 MG: 325 TABLET, FILM COATED ORAL at 10:20

## 2021-01-15 ASSESSMENT — ACTIVITIES OF DAILY LIVING (ADL)
TOILETING_ISSUES: NO
DOING_ERRANDS_INDEPENDENTLY_DIFFICULTY: NO
DIFFICULTY_COMMUNICATING: NO
WEAR_GLASSES_OR_BLIND: NO
DRESSING/BATHING_DIFFICULTY: NO
CONCENTRATING,_REMEMBERING_OR_MAKING_DECISIONS_DIFFICULTY: NO
DIFFICULTY_EATING/SWALLOWING: NO
FALL_HISTORY_WITHIN_LAST_SIX_MONTHS: NO
WALKING_OR_CLIMBING_STAIRS_DIFFICULTY: NO

## 2021-01-15 NOTE — CONSULTS
[facility]  MATERNAL CHILD HEALTH   INITIAL NICU PSYCHOSOCIAL ASSESSMENT     DATA:     Reason for Social Work Consult: 34 Week male in NICU    Presenting Information: SW met with Juliana who is  to Michael. They reside together in [place] with their 2 daughters Saul who will be 5 next month and Lorin 1 .   Their  son is Onur and they are prepared for him at home and are on WIC.      Social Support: Michael's family is nearby and very supportive. They are currently helping with the children at home. Juliana's parents and siblings are in [place] and she does have some family in [place].    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    Insurance: [employer]     Mental Health History: Juliana has no mental health history. She looked at EPDS then reported her score is Zero.    History of Postpartum Mood Disorders: No    Chemical Health History: None    INTERVENTION:       SW completed chart review and collaborated with the multidisciplinary team.     Psychosocial Assessment     Introduction to Maternal Child Health  role and scope of practice     Discussed NICU experience and gave NICU welcome card    Reviewed Hospital and Community Resources     Assessed Chemical Health History and Current Symptoms     Assessed Mental Health History and Current Symptoms     Identified stressors, barriers and family concerns     Provided support and active empathetic listening and validation.     Provided psychoeducation on  mood and anxiety disorders, assessed for any current symptoms or history    Provided brochure Depression and Anxiety During and after Pregnancy.     ASSESSMENT:     Coping: Well    Affect: Bright, frequent smiles and good eye contact.     Mood: appropriate, stable and calm.     Motivation/Ability to Access Services: Independent in accessing services.    Assessment of Support System: stable and involved.    Level of engagement with SW: Engaged and  appropriate. Able to seek out SW when needs arise.     Family s understanding of baby s medical situation: Appropriate understanding.    Family and parent/infant interactions: Parents seem supportive of each other and are bonding with pt as they are able.     Assessment of parental risk for PMAD: Low    Strengths: caring family, willingness to accept help. She reports that her  will drop off pumped milk and bring her to visit baby as they are able.    Identified Barriers: None at this time     PLAN:     SW will continue to follow throughout pt's Maternal-Child Health Journey as needs arise. SW will continue to collaborate with the multidisciplinary team.    Gerardo Duron [facility] Case Management  Inpatient   Maternal Child and ED  [facility]    [phone]

## 2021-01-15 NOTE — L&D DELIVERY NOTE
Juliana Rodriguez is a 25 year old  who was admitted at 34w0d for PPROM at 33+6 wga .  Pregnancy was complicated by PPROM, h/o PTD in setting of cervial insufficiency, GBS unknown.  She received  BMZ #1. She underwent IOL with pitocin. She progressed to fully and began pushing effectively. Pelvis was noted to be adequate.  She delivered a viable male infant with APGARs of 9 and 9, respectively.  Head delivered in an RANJEET position, restituted to LOT and delivered without difficulty.  Right anterior shoulder delivered first, followed by left posterior shoulder without complication, and then rest of body. Spontaneous delivery of an intact placenta with a 3-V cord ensued shortly thereafter.  She received IV pitocin  as a uterotonic agent.  Patient received IV pain meds prior to perineal and vaginal exam. Exam of the perineum revealed a 1st degree laceration, which did not need any repair.  QBL 100cc.      Dana Mckeon MD   2021, 8:30 PM     Delivery Summary    Juliana Rodriguez MRN# [id number]   Age: 25 year old YOB: 1995          Octaviano Rodriguez-Juliana [[id number]]    Labor Event Times    Labor onset date: 21 Onset time:  3:00 PM   Dilation complete date: 21 Complete time:  7:15 PM   Start pushing date/time: 2021 1915      Labor Events     labor?: Yes   steroids: Partial Course  Labor Type: Induction/Cervical ripening  Predominate monitoring during 1st stage: continuous electronic fetal monitoring     Antibiotics received during labor?: Yes  Reason for Antibiotics: GBS  Antibiotics received for GBS: Penicillin     Rupture date/time: 21 0100   Rupture type: Spontaneous rupture of membranes occuring during spontaneous labor or augmentation  Fluid color: Clear  Fluid odor: Normal     Induction date/time:     Cervical ripening date/time:     Indications for induction: Premature ROM     Delivery/Placenta Date and Time    Delivery Date: 21 Delivery Time:  7:21 PM    Placenta Date/Time: 2021  7:27 PM  Oxytocin given at the time of delivery: after delivery of placenta     Vaginal Counts     Initial count performed by 2 team members:  Two Team Members   Dr. Mike Berman RN       Needles Suture Vida Spong[employer] Instruments   Initial counts 2  5    Added to count       Final counts 2  5    Placed during labor Accounted for at the end of labor   NA NA   NA NA   NA NA    Final count performed by 2 team members:  Two Team Members   Dr. Mike Berman RN      Final count correct?: Yes     Apgars    Living status: Living   1 Minute 5 Minute 10 Minute 15 Minute 20 Minute   Skin color: 1  1       Heart rate: 2  2       Reflex irritability: 2  2       Muscle tone: 2  2       Respiratory effort: 2  2       Total: 9  9       Apgars assigned by: ROMA VILLAR RN/     Cord    Vessels: 3 Vessels Complications: None   Cord Blood Disposition: Lab Gases Sent?: No       Resuscitation    Methods: Oxygen, Lance Puff   Care at Delivery: Delivery Clinician:   Dana Mckeon MD Requested NNP attend   at 34 weeks with PPROM   Spontaneous respirations, stimulatued to cry by drying skin with blankets, suction with bulb syringe. Delayed cord clamping for one minute. Brought to warmer. Began having subcostal retractions. Decreased aeration. Suction mouth and nose for clear secretions. SaO2 60's. Placed on CPAP of 6, gradually increasing oxygen to 50% to attain SaO2 of low 90's. Able to wean oxygen to 30% keeping SaO2 low 90's. Infant shown to mother on radiant warmer. Transferred to NICU, father following.   Wilfredo FARMER CNNP MSN 8:23 PM, 2021    Output in Delivery Room: Voided     [place] Measurements    Weight: 5 lb 6.8 oz    Head circumference: 31 cm       Labor Events and Shoulder Dystocia    Fetal Tracing Prior to Delivery: Category 1  Shoulder dystocia present?: Neg     Delivery (Maternal) (Provider to Complete) ([id number])    Episiotomy: None  Perineal lacerations: 1st Repaired?:  No   Est. blood loss (mL): 100     Blood Loss  Mother: Juliana Rodriguez #[id number]   Start of Mother's Information    IO Blood Loss  01/14/21 1500 - 01/14/21 2030    EBL (mL) Hospital Encounter 100 mL    Total  100 mL         End of Mother's Information  Mother: Juliana Rodriguez #[id number]          Delivery - Provider to Complete ([id number])    Delivering clinician: Dana Mckeon MD  Delivery Type (Choose the 1 that will go to the Birth History): Vaginal, Spontaneous                   Other personnel:  Provider Role   Diamond Porter, RN Delivery Nurse   Latricia Jaeger, RN Delivery Assist                Placenta    Delayed Cord Clamping: Done  Date/Time: 1/14/2021  7:27 PM  Removal: Spontaneous  Disposition: Pathology           Anesthesia    Method: None                Presentation and Position    Presentation: Vertex    Position: Left Occiput Anterior                 Dana Mckeon MD

## 2021-01-15 NOTE — PROGRESS NOTES
[facility] Obstetrics Post-Partum Progress Note          Assessment and Plan:    Assessment:   Post-partum day #1    Normal spontaneous vaginal delivery at 3 weeks  L&D complications: None  Baby in NICU      Doing well.  No excessive bleeding  Pain well-controlled.      Plan:   Anticipate discharge tomorrow  Routine post partum cares today           Interval History:   Doing well.  Pain is adequately controlled.  No fevers.  No history of foul-smelling vaginal discharge.  Good appetite.  Denies chest pain, shortness of breath, nausea or vomiting.  Vaginal bleeding is similar to a heavy menstrual flow.  Breastfeeding well.          Significant Problems:    None          Review of Systems:    The patient denies any chest pain, shortness of breath, excessive pain, fever, chills, purulent drainage from the wound, nausea or vomiting.          Medications:   All medications have been reviewed          Physical Exam:   All vitals stable  Uterine fundus is firm, non-tender and at the level of the umbilicus          Data:   All laboratory data reviewed      Jony Sanchez

## 2021-01-16 VITALS
TEMPERATURE: 97.4 F | RESPIRATION RATE: 18 BRPM | SYSTOLIC BLOOD PRESSURE: 100 MMHG | DIASTOLIC BLOOD PRESSURE: 60 MMHG | HEART RATE: 58 BPM

## 2021-01-16 PROBLEM — O34.33 CERVICAL INSUFFICIENCY DURING PREGNANCY IN THIRD TRIMESTER, ANTEPARTUM: Status: ACTIVE | Noted: 2021-01-16

## 2021-01-16 PROCEDURE — 250N000013 HC RX MED GY IP 250 OP 250 PS 637: Performed by: OBSTETRICS & GYNECOLOGY

## 2021-01-16 RX ORDER — FERROUS SULFATE 325(65) MG
325 TABLET ORAL
Qty: 60 TABLET | Refills: 0 | Status: ON HOLD | OUTPATIENT
Start: 2021-01-16 | End: 2023-05-25

## 2021-01-16 RX ORDER — IBUPROFEN 600 MG/1
600 TABLET, FILM COATED ORAL EVERY 6 HOURS PRN
Qty: 90 TABLET | Refills: 0 | Status: SHIPPED | OUTPATIENT
Start: 2021-01-16

## 2021-01-16 RX ORDER — AMOXICILLIN 250 MG
1 CAPSULE ORAL 2 TIMES DAILY PRN
Qty: 120 TABLET | Refills: 1 | Status: SHIPPED | OUTPATIENT
Start: 2021-01-16

## 2021-01-16 RX ADMIN — IBUPROFEN 800 MG: 800 TABLET ORAL at 00:29

## 2021-01-16 RX ADMIN — IBUPROFEN 800 MG: 800 TABLET ORAL at 08:00

## 2021-01-16 RX ADMIN — DOCUSATE SODIUM 50 MG AND SENNOSIDES 8.6 MG 1 TABLET: 8.6; 5 TABLET, FILM COATED ORAL at 08:00

## 2021-01-16 RX ADMIN — FERROUS SULFATE TAB 325 MG (65 MG ELEMENTAL FE) 325 MG: 325 (65 FE) TAB at 08:00

## 2021-01-16 NOTE — DISCHARGE SUMMARY
"DISCHARGE SUMMARY:  Vaginal Delivery    Date of Admission:  2021     Date of Discharge:  2021     Procedure:  Vaginal Delivery    HPI:    Juliana Rodriguez is a 25 year old  who was admitted at 34w0d for PPROM at 33+6 wga .  Pregnancy was complicated by PPROM, h/o PTD in setting of cervial insufficiency, GBS unknown.  She received  BMZ #1. She underwent IOL with pitocin. She progressed to fully and began pushing effectively. Pelvis was noted to be adequate.  She delivered a viable male infant \"Hashim\" weighing 2460 g with APGARs of 9 and 9, respectively.  Head delivered in an RANJEET position, restituted to LOT and delivered without difficulty.  Right anterior shoulder delivered first, followed by left posterior shoulder without complication, and then rest of body. Spontaneous delivery of an intact placenta with a 3-V cord ensued shortly thereafter.  She received IV pitocin  as a uterotonic agent.  Patient received IV pain meds prior to perineal and vaginal exam. Exam of the perineum revealed a 1st degree laceration, which did not need any repair.  QBL 100cc.      Juliana had an unremarkable recovery.  hgb was 10.5 and she requests iron prescription, stool softener, and ibuprofen rx.     OBJECTIVE:  On exam today:  BP Readings from Last 1 Encounters:   21 100/60     Pulse Readings from Last 1 Encounters:   21 58     Wt Readings from Last 1 Encounters:   18 56.2 kg (124 lb)     Ht Readings from Last 1 Encounters:   18 1.6 m (5' 3\")     Estimated body mass index is 21.97 kg/m  as calculated from the following:    Height as of 18: 1.6 m (5' 3\").    Weight as of 18: 56.2 kg (124 lb).    Temp Readings from Last 1 Encounters:   21 97.4  F (36.3  C) (Oral)      WDWN woman in NAD      ASSESSMENT:  Post partum day 2, s/p  vaginal delivery at 34 weeks  Active Problems:    Encounter for triage in pregnant patient (8/3/2019)    Indication for care in labor or delivery " (1/13/2021)  Cervical insufficiency  PROM  anemia      PLAN:    Juliana is discharged home today in good condition.  Changes and what to expect and watch for in the puerperium were reviewed, including symptoms of post partum depression.  She is to follow up in clinic in 6 weeks.  Pelvic rest until that time.  Discharge medications include ibuprofen 600-800 mg po q 6 hours prn, prenatal vitamin daily, iron supplement daily, and stool softeners prn.    Nisha Rodriguez MD on 1/16/2021 at 9:39 AM

## 2021-01-16 NOTE — DISCHARGE INSTRUCTIONS
"Juliana-    Congratulations on your new baby!    A few instructions:    -No tampons/douching/intercourse x 6 weeks.  See your health care provider first, to be sure you are healed.  -If you have stitches, it may help you feel better sooner ifyou can sit in the tub twice daily for a few minutes, for the first week after delivery.  Keep the area clean and dry.  -Call your clinic if fever, worsening pain, or any questions.    Medications:  Ibuprofen 600 mg every 6 hours will be helpful for pain and cramping.  You may want to use a stool softener if needed, available over the counter, like Colace 100 mg twice daily.    Post partum depression:   10-15% of women will have more than the baby blues--if you are feeling very sad or down, having regrets, can't stop thinking about certain thoughts or actions, you could be experiencing post partum depression.  Please call your health care provider to discuss.    Follow up:  Please call your clinic soon to schedule a \"post partum visit\" for 6 weeks from now.   If you need anything by prescription for contraception, they will go over it with you at that visit.    Again, congratulations!  Welcome, baby Hashim!    Nisha Rodriguez MD January 16, 2021      Vaginal Delivery Discharge Instructions: Andrew Jimenez:     Waydiiso qoyska iyo saaxibadaa inay ku caawiyaan markaad u baahantahay.    Coltonba marleny santana ammirtha hemphilla ilaa uu dhakhtarkaagu ansixiyo.    Si fudud u qaado dhowrka asbuuc e xiga si aad u ogalaato in jirLanterman Developmental Center chano kabto. Waxaad jethro lassitertaa howl kasta oo aad rabto ilaa meeshaas laga gaarayo.    Gaadhradha hodge qaadanayso  kaniiniyada xanuunka ee dhkhatarku kuu qoray . rosita gaaaurai sandra kartaa haddii aad qaadanayso kaniiniyada xanuunka ee koontarka.    [facility] bixiyahaaga jeysonyeharsha nance qabtid mid ka [facility] chano socda:    Sayra chavarriaiigga nuAlliance Hospital morganu ku brandonBa[facility] – [place], [place] 1 [facility] chente sky dhiig  oo ka " wayn kubadda golafka.     Dhiig soconaya wax kabadan 6 asbuuc.    Haddii aad qabto dheecaan farjiga ka imaanaya oo  si xun u uraya.     [facility] 100.4  F (38  C) am aka sii saraysa (heerkulka laga qaaday carabka hoostiiisa), oo qarqaryo leh ama aan lahayn     Xanuun, nabar, majiirid daran oo aad ka dareeto qaybta hoose ee ubucda.    Xanuun sii kordya, barar, guduudasho ama dheecaan ka dhiimaya meesha la tolay ee qaliinka.    Kaadi badan oo dagdag ah oo markasta ku qabanaysa , ama gubasho aad dareento markaad kaajayso.    Guduudasho, barar, ama xanuun aad ka dareento xididada lugta.    Dhibaato kaa haysata naas nuujinta, ama guduudasho ama xanuun naaska ah.    Xanuun sii kordhaya ama aan ka dhamaanayn meesha la tolay ama danqashada dilaaca.    Lalabbo ama matag.    Xabad xanuun iyo qufac ama naqaska oo kugu dhagaya.    Dhibaato ay la socoto murugoradha, aa sierra.     Haddii aad qabtid wax adelaidaBingham Memorial Hospital oo ku saabsan inaad waxyeelayso naftaada ama ilmaha, wac dhakhtarka jeanna markiiba.     Haddii aad qabto castillo aalo ama sierra chiangaajennifer guriga ku noqoto kadib.    Gacmahaagga nadiifi:  Markasta dhaqa gacahaaga ka hor inta aadan taaban farjiga agagaarkiisa  iyo meesha la tolay. Te waxay caawiniaysaa inuu yaraado infakshanku. Hdii gacmahaagu aysan wasakh lahayn, waxaad isticmaali kartaa aalkalo aad gacmaha ku tirtirto si aad u nadbony rivera. Javon mccartyoo jar.      Vaginal Delivery Discharge Instructions  Activity:     Ask family and friends for help when you need it.    Do not place anything in your vagina until your doctor approves.    Take it easy for the next few weeks to allow your body to recover. You may do any activities you feel up to at that point.    Do not drive while taking pain pills prescribed by your doctor. You may drive if taking over-the-counter pain pills.    Call your health care provider if you have any of these symptoms:    You soak a sanitary pad with blood within 1 hour, or you see  blood clots larger than a golf ball.    Bleeding that lasts more than 6 weeks.    You have vaginal discharge that smells bad.     A fever of 100.4  F (38  C) or higher (temperature taken under your tongue), with or without chills     Severe, pain, cramping or tenderness in your lower belly area.    Increased pain, swelling, redness or fluid around your stitches.    A more frequent or urgent need to urinate (pee), or it burns when you pee.    Redness, swelling or pain around a vein in your leg.    Problems breastfeeding, or a red or painful area on your breast.    Pain that increases or does not go away from an episiotomy or perineal tear.    Nausea and vomiting.    Chest pain and cough or are gasping for air.    Problems coping with sadness, anxiety, or depression.     If you have any concerns about hurting yourself or the baby, call your doctor right away.      You have questions or concerns after you return home.    Keep your hands clean:  Always wash your hands before touching your perineal area and stitches.  This helps reduce your risk of infection.  If your hands aren t dirty, you may use an alcohol hand-rub to clean your hands. Keep your nails clean and short.

## 2021-01-18 LAB — COPATH REPORT: NORMAL

## 2021-12-02 ENCOUNTER — HOSPITAL ENCOUNTER (EMERGENCY)
Facility: CLINIC | Age: 26
Discharge: HOME | End: 2021-12-02
Attending: PHYSICIAN ASSISTANT | Admitting: PHYSICIAN ASSISTANT
Payer: COMMERCIAL

## 2021-12-02 VITALS
WEIGHT: 125 LBS | RESPIRATION RATE: 16 BRPM | SYSTOLIC BLOOD PRESSURE: 125 MMHG | OXYGEN SATURATION: 99 % | HEART RATE: 87 BPM | TEMPERATURE: 98.1 F | BODY MASS INDEX: 22.14 KG/M2 | DIASTOLIC BLOOD PRESSURE: 75 MMHG

## 2021-12-02 DIAGNOSIS — H60.391 INFECTIVE OTITIS EXTERNA, RIGHT: ICD-10-CM

## 2021-12-02 PROCEDURE — 99283 EMERGENCY DEPT VISIT LOW MDM: CPT

## 2021-12-02 RX ORDER — CIPROFLOXACIN AND DEXAMETHASONE 3; 1 MG/ML; MG/ML
4 SUSPENSION/ DROPS AURICULAR (OTIC) 2 TIMES DAILY
Qty: 2.8 ML | Refills: 0 | Status: SHIPPED | OUTPATIENT
Start: 2021-12-02 | End: 2021-12-09

## 2021-12-03 NOTE — ED PROVIDER NOTES
History   Chief Complaint:  Otalgia       HPI   Juliana Rodriguez is a 26 year old female who presents with otalgia.  The patient reports that she experienced a gradual onset of right ear pain 3 days prior which has remained persistent since onset prompting her visit.  She denied fever, chills, abnormal drainage from the ear, chest pain, shortness of breath, or any other medical concerns.  No known trauma to the ear however, the patient states that she periodically attempts to remove wax with her finger.      Review of Systems   HENT: Positive for ear pain.    All other systems reviewed and are negative.      Allergies:  Influenza Vaccine Live    Medications:  Ferosul  Senna-docusate    Past Medical History:     Acne  Dysmenorrhea  Venereal warts      Social History:  The patient was accompanied to the ED by herself.  PCP: [facility], [place]     Physical Exam     Patient Vitals for the past 24 hrs:   BP Temp Temp src Pulse Resp SpO2 Weight   12/02/21 2159 125/75 98.1  F (36.7  C) Temporal 87 16 99 % 56.7 kg (125 lb)       Physical Exam  Vitals signs and nursing note reviewed.   HENT:      Nose: Nose normal. No congestion or rhinorrhea.       Eyes:      General: No scleral icterus.     Extraocular Movements: Extraocular movements intact.   Right ear: Edematous and erythematous ear canal. No obvious drainage. Pain with auricle manipulation.   Cardiovascular:      Rate and Rhythm: Regular rhythm. Normal Rate.     Pulses: Normal pulses.   Pulmonary:      Effort: Pulmonary effort is normal.      Breath sounds: Normal breath sounds.   Musculoskeletal: Normal range of motion of neck.   Skin:     General: Skin is warm and dry.   Neurological:      Mental Status: She is alert and oriented to person, place, and time.   Psychiatric:         Mood and Affect: Mood normal.         Behavior: Behavior normal.       Emergency Department Course     Procedures  None    Emergency Department Course:  Reviewed:  I reviewed nursing  notes, vitals, past medical history and Care Everywhere    Assessments:  2215 I obtained history and examined the patient as noted above.     Disposition:  The patient was discharged to home.     Impression & Plan     CMS Diagnoses: None     Medical Decision Making:  Juliana Rodriguez is a 26 year old female presents with bilateral ear pain and has an exam consistent with otitis externa. There is no sign of TM perforation, otitis media, mastoiditis, mass, dental abscess, strep pharyngitis or peritonsillar abscess. The patient will be started on topical antibiotics and may take Tylenol or Ibuprofen for pain.  Return if increasing pain, fever, decrease in hearing or ear discharge that persists.  Follow-up with primary physician in 3-5 days for reassessment. Return to the ED as needed.  All questions and concerns were addressed prior to discharge.    Diagnosis:    ICD-10-CM    1. Infective otitis externa, right  H60.391        Discharge Medications:  New Prescriptions    CIPROFLOXACIN-DEXAMETHASONE (CIPRODEX) 0.3-0.1 % OTIC SUSPENSION    Place 4 drops into the right ear 2 times daily for 7 days       Scribe Disclosure:  TATIANA, Madhuri Man, am serving as a scribe at 10:18 PM on 12/2/2021 to document services personally performed by Talya Avila PA-C based on my observations and the provider's statements to me.               Talya Avila PA-C  12/02/21 3122

## 2021-12-03 NOTE — DISCHARGE INSTRUCTIONS
Please review attached instructions.  Follow-up with your primary care provider in 2 to 3 days for reassessment.  Return the emergency department as needed.

## 2022-10-18 LAB
HEPATITIS B SURFACE ANTIGEN (EXTERNAL): NEGATIVE
HIV1+2 AB SERPL QL IA: NEGATIVE
RUBELLA ANTIBODY IGG (EXTERNAL): NORMAL
TREPONEMA PALLIDUM ANTIBODY (EXTERNAL): NONREACTIVE

## 2023-05-01 LAB — GROUP B STREPTOCOCCUS (EXTERNAL): NEGATIVE

## 2023-05-24 ENCOUNTER — HOSPITAL ENCOUNTER (INPATIENT)
Facility: CLINIC | Age: 28
LOS: 1 days | Discharge: HOME | End: 2023-05-25
Attending: OBSTETRICS & GYNECOLOGY | Admitting: OBSTETRICS & GYNECOLOGY
Payer: COMMERCIAL

## 2023-05-24 LAB
ABO/RH(D): NORMAL
ANTIBODY SCREEN: NEGATIVE
ERYTHROCYTE [DISTWIDTH] IN BLOOD BY AUTOMATED COUNT: 15.6 % (ref 10–15)
HCT VFR BLD AUTO: 35.3 % (ref 35–47)
HGB BLD-MCNC: 11.3 G/DL (ref 11.7–15.7)
HGB BLD-MCNC: 11.5 G/DL (ref 11.7–15.7)
MCH RBC QN AUTO: 27.8 PG (ref 26.5–33)
MCHC RBC AUTO-ENTMCNC: 32.6 G/DL (ref 31.5–36.5)
MCV RBC AUTO: 86 FL (ref 78–100)
PLATELET # BLD AUTO: 134 10E3/UL (ref 150–450)
RBC # BLD AUTO: 4.13 10E6/UL (ref 3.8–5.2)
SPECIMEN EXPIRATION DATE: NORMAL
T PALLIDUM AB SER QL: NONREACTIVE
WBC # BLD AUTO: 6.9 10E3/UL (ref 4–11)

## 2023-05-24 PROCEDURE — 86850 RBC ANTIBODY SCREEN: CPT | Performed by: OBSTETRICS & GYNECOLOGY

## 2023-05-24 PROCEDURE — 86901 BLOOD TYPING SEROLOGIC RH(D): CPT | Performed by: OBSTETRICS & GYNECOLOGY

## 2023-05-24 PROCEDURE — 250N000013 HC RX MED GY IP 250 OP 250 PS 637: Performed by: OBSTETRICS & GYNECOLOGY

## 2023-05-24 PROCEDURE — 86780 TREPONEMA PALLIDUM: CPT | Performed by: OBSTETRICS & GYNECOLOGY

## 2023-05-24 PROCEDURE — 722N000001 HC LABOR CARE VAGINAL DELIVERY SINGLE

## 2023-05-24 PROCEDURE — 250N000011 HC RX IP 250 OP 636: Performed by: OBSTETRICS & GYNECOLOGY

## 2023-05-24 PROCEDURE — 120N000001 HC R&B MED SURG/OB

## 2023-05-24 PROCEDURE — 85027 COMPLETE CBC AUTOMATED: CPT | Performed by: OBSTETRICS & GYNECOLOGY

## 2023-05-24 PROCEDURE — 250N000009 HC RX 250: Performed by: OBSTETRICS & GYNECOLOGY

## 2023-05-24 PROCEDURE — 258N000003 HC RX IP 258 OP 636: Performed by: OBSTETRICS & GYNECOLOGY

## 2023-05-24 PROCEDURE — 10907ZC DRAINAGE OF AMNIOTIC FLUID, THERAPEUTIC FROM PRODUCTS OF CONCEPTION, VIA NATURAL OR ARTIFICIAL OPENING: ICD-10-PCS | Performed by: OBSTETRICS & GYNECOLOGY

## 2023-05-24 PROCEDURE — 85018 HEMOGLOBIN: CPT | Performed by: OBSTETRICS & GYNECOLOGY

## 2023-05-24 PROCEDURE — 36415 COLL VENOUS BLD VENIPUNCTURE: CPT | Performed by: OBSTETRICS & GYNECOLOGY

## 2023-05-24 RX ORDER — NALOXONE HYDROCHLORIDE 0.4 MG/ML
0.4 INJECTION, SOLUTION INTRAMUSCULAR; INTRAVENOUS; SUBCUTANEOUS
Status: DISCONTINUED | OUTPATIENT
Start: 2023-05-24 | End: 2023-05-24 | Stop reason: HOSPADM

## 2023-05-24 RX ORDER — TRANEXAMIC ACID 10 MG/ML
1 INJECTION, SOLUTION INTRAVENOUS EVERY 30 MIN PRN
Status: DISCONTINUED | OUTPATIENT
Start: 2023-05-24 | End: 2023-05-24 | Stop reason: HOSPADM

## 2023-05-24 RX ORDER — MISOPROSTOL 200 UG/1
800 TABLET ORAL
Status: DISCONTINUED | OUTPATIENT
Start: 2023-05-24 | End: 2023-05-25 | Stop reason: HOSPADM

## 2023-05-24 RX ORDER — BISACODYL 10 MG
10 SUPPOSITORY, RECTAL RECTAL DAILY PRN
Status: DISCONTINUED | OUTPATIENT
Start: 2023-05-24 | End: 2023-05-25 | Stop reason: HOSPADM

## 2023-05-24 RX ORDER — NALOXONE HYDROCHLORIDE 0.4 MG/ML
0.2 INJECTION, SOLUTION INTRAMUSCULAR; INTRAVENOUS; SUBCUTANEOUS
Status: DISCONTINUED | OUTPATIENT
Start: 2023-05-24 | End: 2023-05-24 | Stop reason: HOSPADM

## 2023-05-24 RX ORDER — METOCLOPRAMIDE HYDROCHLORIDE 5 MG/ML
10 INJECTION INTRAMUSCULAR; INTRAVENOUS EVERY 6 HOURS PRN
Status: DISCONTINUED | OUTPATIENT
Start: 2023-05-24 | End: 2023-05-24 | Stop reason: HOSPADM

## 2023-05-24 RX ORDER — METHYLERGONOVINE MALEATE 0.2 MG/ML
200 INJECTION INTRAVENOUS
Status: DISCONTINUED | OUTPATIENT
Start: 2023-05-24 | End: 2023-05-24 | Stop reason: HOSPADM

## 2023-05-24 RX ORDER — TRANEXAMIC ACID 10 MG/ML
1 INJECTION, SOLUTION INTRAVENOUS EVERY 30 MIN PRN
Status: DISCONTINUED | OUTPATIENT
Start: 2023-05-24 | End: 2023-05-25 | Stop reason: HOSPADM

## 2023-05-24 RX ORDER — FENTANYL CITRATE 50 UG/ML
100 INJECTION, SOLUTION INTRAMUSCULAR; INTRAVENOUS
Status: DISCONTINUED | OUTPATIENT
Start: 2023-05-24 | End: 2023-05-24 | Stop reason: HOSPADM

## 2023-05-24 RX ORDER — ONDANSETRON 4 MG/1
4 TABLET, ORALLY DISINTEGRATING ORAL EVERY 6 HOURS PRN
Status: DISCONTINUED | OUTPATIENT
Start: 2023-05-24 | End: 2023-05-24 | Stop reason: HOSPADM

## 2023-05-24 RX ORDER — ONDANSETRON 2 MG/ML
4 INJECTION INTRAMUSCULAR; INTRAVENOUS EVERY 6 HOURS PRN
Status: DISCONTINUED | OUTPATIENT
Start: 2023-05-24 | End: 2023-05-24 | Stop reason: HOSPADM

## 2023-05-24 RX ORDER — CARBOPROST TROMETHAMINE 250 UG/ML
250 INJECTION, SOLUTION INTRAMUSCULAR
Status: DISCONTINUED | OUTPATIENT
Start: 2023-05-24 | End: 2023-05-25 | Stop reason: HOSPADM

## 2023-05-24 RX ORDER — METHYLERGONOVINE MALEATE 0.2 MG/ML
200 INJECTION INTRAVENOUS
Status: DISCONTINUED | OUTPATIENT
Start: 2023-05-24 | End: 2023-05-25 | Stop reason: HOSPADM

## 2023-05-24 RX ORDER — OXYTOCIN/0.9 % SODIUM CHLORIDE 30/500 ML
340 PLASTIC BAG, INJECTION (ML) INTRAVENOUS CONTINUOUS PRN
Status: DISCONTINUED | OUTPATIENT
Start: 2023-05-24 | End: 2023-05-25 | Stop reason: HOSPADM

## 2023-05-24 RX ORDER — MODIFIED LANOLIN
OINTMENT (GRAM) TOPICAL
Status: DISCONTINUED | OUTPATIENT
Start: 2023-05-24 | End: 2023-05-25 | Stop reason: HOSPADM

## 2023-05-24 RX ORDER — DOCUSATE SODIUM 100 MG/1
100 CAPSULE, LIQUID FILLED ORAL DAILY
Status: DISCONTINUED | OUTPATIENT
Start: 2023-05-24 | End: 2023-05-25 | Stop reason: HOSPADM

## 2023-05-24 RX ORDER — KETOROLAC TROMETHAMINE 30 MG/ML
30 INJECTION, SOLUTION INTRAMUSCULAR; INTRAVENOUS
Status: DISCONTINUED | OUTPATIENT
Start: 2023-05-24 | End: 2023-05-25 | Stop reason: HOSPADM

## 2023-05-24 RX ORDER — OXYTOCIN 10 [USP'U]/ML
10 INJECTION, SOLUTION INTRAMUSCULAR; INTRAVENOUS
Status: DISCONTINUED | OUTPATIENT
Start: 2023-05-24 | End: 2023-05-25 | Stop reason: HOSPADM

## 2023-05-24 RX ORDER — IBUPROFEN 800 MG/1
800 TABLET, FILM COATED ORAL EVERY 6 HOURS PRN
Status: DISCONTINUED | OUTPATIENT
Start: 2023-05-24 | End: 2023-05-25 | Stop reason: HOSPADM

## 2023-05-24 RX ORDER — MISOPROSTOL 200 UG/1
400 TABLET ORAL
Status: DISCONTINUED | OUTPATIENT
Start: 2023-05-24 | End: 2023-05-25 | Stop reason: HOSPADM

## 2023-05-24 RX ORDER — CARBOPROST TROMETHAMINE 250 UG/ML
250 INJECTION, SOLUTION INTRAMUSCULAR
Status: DISCONTINUED | OUTPATIENT
Start: 2023-05-24 | End: 2023-05-24 | Stop reason: HOSPADM

## 2023-05-24 RX ORDER — OXYTOCIN 10 [USP'U]/ML
10 INJECTION, SOLUTION INTRAMUSCULAR; INTRAVENOUS
Status: DISCONTINUED | OUTPATIENT
Start: 2023-05-24 | End: 2023-05-24 | Stop reason: HOSPADM

## 2023-05-24 RX ORDER — PROCHLORPERAZINE MALEATE 10 MG
10 TABLET ORAL EVERY 6 HOURS PRN
Status: DISCONTINUED | OUTPATIENT
Start: 2023-05-24 | End: 2023-05-24 | Stop reason: HOSPADM

## 2023-05-24 RX ORDER — ACETAMINOPHEN 325 MG/1
650 TABLET ORAL EVERY 4 HOURS PRN
Status: DISCONTINUED | OUTPATIENT
Start: 2023-05-24 | End: 2023-05-25 | Stop reason: HOSPADM

## 2023-05-24 RX ORDER — CITRIC ACID/SODIUM CITRATE 334-500MG
30 SOLUTION, ORAL ORAL
Status: DISCONTINUED | OUTPATIENT
Start: 2023-05-24 | End: 2023-05-24 | Stop reason: HOSPADM

## 2023-05-24 RX ORDER — IBUPROFEN 800 MG/1
800 TABLET, FILM COATED ORAL
Status: DISCONTINUED | OUTPATIENT
Start: 2023-05-24 | End: 2023-05-25 | Stop reason: HOSPADM

## 2023-05-24 RX ORDER — SODIUM CHLORIDE, SODIUM LACTATE, POTASSIUM CHLORIDE, CALCIUM CHLORIDE 600; 310; 30; 20 MG/100ML; MG/100ML; MG/100ML; MG/100ML
INJECTION, SOLUTION INTRAVENOUS CONTINUOUS
Status: DISCONTINUED | OUTPATIENT
Start: 2023-05-24 | End: 2023-05-24 | Stop reason: HOSPADM

## 2023-05-24 RX ORDER — PROCHLORPERAZINE 25 MG
25 SUPPOSITORY, RECTAL RECTAL EVERY 12 HOURS PRN
Status: DISCONTINUED | OUTPATIENT
Start: 2023-05-24 | End: 2023-05-24 | Stop reason: HOSPADM

## 2023-05-24 RX ORDER — MISOPROSTOL 200 UG/1
800 TABLET ORAL
Status: DISCONTINUED | OUTPATIENT
Start: 2023-05-24 | End: 2023-05-24 | Stop reason: HOSPADM

## 2023-05-24 RX ORDER — OXYTOCIN/0.9 % SODIUM CHLORIDE 30/500 ML
100-340 PLASTIC BAG, INJECTION (ML) INTRAVENOUS CONTINUOUS PRN
Status: DISCONTINUED | OUTPATIENT
Start: 2023-05-24 | End: 2023-05-25 | Stop reason: HOSPADM

## 2023-05-24 RX ORDER — METOCLOPRAMIDE 10 MG/1
10 TABLET ORAL EVERY 6 HOURS PRN
Status: DISCONTINUED | OUTPATIENT
Start: 2023-05-24 | End: 2023-05-24 | Stop reason: HOSPADM

## 2023-05-24 RX ORDER — HYDROCORTISONE 25 MG/G
CREAM TOPICAL 3 TIMES DAILY PRN
Status: DISCONTINUED | OUTPATIENT
Start: 2023-05-24 | End: 2023-05-25 | Stop reason: HOSPADM

## 2023-05-24 RX ORDER — MISOPROSTOL 200 UG/1
400 TABLET ORAL
Status: DISCONTINUED | OUTPATIENT
Start: 2023-05-24 | End: 2023-05-24 | Stop reason: HOSPADM

## 2023-05-24 RX ORDER — OXYTOCIN/0.9 % SODIUM CHLORIDE 30/500 ML
340 PLASTIC BAG, INJECTION (ML) INTRAVENOUS CONTINUOUS PRN
Status: DISCONTINUED | OUTPATIENT
Start: 2023-05-24 | End: 2023-05-24 | Stop reason: HOSPADM

## 2023-05-24 RX ADMIN — BENZOCAINE: 11.4 AEROSOL, SPRAY TOPICAL at 09:53

## 2023-05-24 RX ADMIN — IBUPROFEN 800 MG: 800 TABLET ORAL at 14:48

## 2023-05-24 RX ADMIN — ACETAMINOPHEN 650 MG: 325 TABLET ORAL at 09:07

## 2023-05-24 RX ADMIN — KETOROLAC TROMETHAMINE 30 MG: 30 INJECTION, SOLUTION INTRAMUSCULAR at 07:48

## 2023-05-24 RX ADMIN — IBUPROFEN 800 MG: 800 TABLET ORAL at 21:10

## 2023-05-24 RX ADMIN — DOCUSATE SODIUM 100 MG: 100 CAPSULE, LIQUID FILLED ORAL at 09:07

## 2023-05-24 RX ADMIN — Medication 340 ML/HR: at 07:43

## 2023-05-24 RX ADMIN — SODIUM CHLORIDE, POTASSIUM CHLORIDE, SODIUM LACTATE AND CALCIUM CHLORIDE 500 ML: 600; 310; 30; 20 INJECTION, SOLUTION INTRAVENOUS at 14:49

## 2023-05-24 ASSESSMENT — ACTIVITIES OF DAILY LIVING (ADL)
ADLS_ACUITY_SCORE: 18

## 2023-05-24 NOTE — PROGRESS NOTES
Pt now requesting AROM  FHT Cat I  SVE , AROM done for clear  Anticipate     Dr. Italia Calero  [phone]

## 2023-05-24 NOTE — CARE PLAN
Data: Juliana Rodriguez transferred to 450 via wheelchair at 1002. Baby transferred via parent's arms.  Action: Receiving unit notified of transfer: Yes. Patient and family notified of room change. Report given to Sayda CORONA at 1007. Belongings sent to receiving unit. Accompanied by Registered Nurse. Oriented patient to surroundings. Call light within reach. ID bands double-checked with Sayda CORONA.  Response: Patient tolerated transfer and is stable. Patient ambulated to the bathroom independently, only able to void 100 ml. Patient encouraged increasing oral hydration and will try to void again in 1-2 hours, patient verbalized understanding.

## 2023-05-24 NOTE — PROVIDER NOTIFICATION
05/24/23 1422   Provider Notification   Provider Name/Title Dr Cano   Method of Notification Phone   Request Evaluate-Remote   Notification Reason Vital Signs Change     Updated on BP of 87/51. Pt c/o of slight dizziness when standing up that quickly resolves. Verbal order received for 500 ml bolus of LR over 30 mins and stat hgb.

## 2023-05-24 NOTE — PROVIDER NOTIFICATION
23 0342   Provider Notification   Provider Name/Title Dr. Calero   Method of Notification Phone     MD notified of patient arrival.  at 39 weeks, here for rule out labor. History of gestational thrombocytopenia with this pregnancy & hx of IUFD and  deliveries x2. Patient reports feeling contractions beginning at 2300. Denies LOF & vaginal bleeding. SVE 4.5/85/-1. Rodrigo q5-7 minutes, palpating strong. FHR moderate with accels. Orders to admit patient & recheck cervix in 45 minutes.

## 2023-05-24 NOTE — PROVIDER NOTIFICATION
05/24/23 0442   Provider Notification   Provider Name/Title Dr. Calero   Method of Notification Phone     MD updated on patient. SVE 5/90/ballotable. Rodrigo q5-8, FHR moderate with accels and occ earlies. Patient wanting to continue laboring for another hour. Will update MD at 0600 unless otherwise indicated.

## 2023-05-24 NOTE — L&D DELIVERY NOTE
Juliana Rodriguez is a 27 year old  who was admitted at 39w0d for labor.  .  Pregnancy was complicated by history of  deliveries and and IUFD.  She arrived at 7 cm and made quick progress to complete. She underwent labor augmentation with Arom.  AROM occurred notable for clear fluid.  She pushed effectively, however there was a short delay--maybe 20 sec--after delivery of the head because she was not keeping her knees back and she was lifting her bottom. Suprapubic pressure was given, but I did not feel it was a true shoulder dystocia.  She delivered a viable male infant at 7:41  with APGARs of 8 and 9 respectively.  Spontaneously delivery of an intact placenta with a 3-V cord ensued shortly thereafter.  She received IV pitocin as a uterotonic agent.  Exam of the perineum revealed a 1st degree laceration which was not repaired. QBL 50cc.      Laura Cano MD  [phone]  May 24, 2023, 8:02 AM     Delivery Summary    Juliana Rodriguez MRN# [id number]   Age: 27 year old YOB: 1995          Octaviano Rodriguez-Juliana [[id number]]    Labor Event Times    Latent labor onset date/time: 2023 2300    Start pushing date/time: 2023 0737      Labor Events     labor?: No   steroids: None  Labor Type: Spontaneous  Predominate monitoring during 1st stage: continuous electronic fetal monitoring     Antibiotics received during labor?: No     Rupture date/time: 23 0644   Rupture type: Artificial Rupture of Membranes  Fluid color: Clear  Fluid odor: Normal     Augmentation: AROM     Delivery/Placenta Date and Time    Delivery Date: 23 Delivery Time:  7:41 AM   Placenta Date/Time: 2023  7:44 AM  Oxytocin given at the time of delivery: after delivery of baby  Delivering clinician: Laura Cano MD   Other personnel present at delivery:  Provider Role   Latricia Jaeger, CJ          Vaginal Counts     Initial count performed by 2 team members:  Two Team Members   Dr. Jerome Marcus  RN       Needles Suture Needles Sponges (RETIRED) Instruments   Initial counts 2  5    Added to count       Relief counts       Final counts 2  5          Placed during labor Accounted for at the end of labor   FSE No NA   IUPC No NA   Cervidil No NA              Final count performed by 2 team members:  Two Team Members   Dr. Jerome MOORE RN      Final count correct?: Yes     Apgars    Living status: Living   1 Minute 5 Minute 10 Minute 15 Minute 20 Minute   Skin color: 0  1       Heart rate: 2  2       Reflex irritability: 2  2       Muscle tone: 2  2       Respiratory effort: 2  2       Total: 8  9       Apgars assigned by: LATRICIA MOORE RN     Cord    Vessels: 3 Vessels    Cord Complications: None   Cord Blood Disposition: Lab      Gases Sent?: No      Delayed cord clamping?: Yes      Cord Clamping Delay (seconds): 31-60 seconds      Stem cell collection?: No                     Labor Events and Shoulder Dystocia    Fetal Tracing Prior to Delivery: Category 1  Shoulder dystocia present?: Neg     Delivery (Maternal) (Provider to Complete) ([id number])    Episiotomy: None  Perineal lacerations: 1st Repaired?: No   Genital tract inspection done: Pos     Blood Loss  Mother: Juliana Rodriguez #[id number]   Start of Mother's Information    Delivery Blood Loss  05/23/23 1941 - 05/24/23 0802    Delivery QBL (mL) Hospital Encounter 50 mL    Total  50 mL         End of Mother's Information  Mother: Juliana Rodriguez #[id number]          Delivery - Provider to Complete ([id number])    Delivering clinician: Laura Cano MD  Delivery Type (Choose the 1 that will go to the Birth History): Vaginal, Spontaneous    Other personnel:  Provider Role   Latricia Jaeger RN                                Placenta    Date/Time: 5/24/2023  7:44 AM  Removal: Spontaneous  Disposition: Hospital disposal           Anesthesia    Method: None                Presentation and Position    Presentation: Vertex     Occiput Anterior                 Laura Cano  MD

## 2023-05-24 NOTE — PLAN OF CARE
Data: Patient presented to [facility]: 2023  3:14 AM.  Reason for maternal/fetal assessment is uterine contractions. Patient reports feeling contractions at 11pm.  Patient is a .  Prenatal record reviewed. Pregnancy  has been complicated by gestational thrombocytopenia & hyperemesis.  Gestational Age 39w0d. VSS. Fetal movement active. Patient denies leaking of vaginal fluid/rupture of membranes, vaginal bleeding, abdominal pain, nausea, vomiting, headache, visual disturbances, epigastric or URQ pain, significant edema. Support person is present.   Action: Verbal consent for EFM. Triage assessment completed. Bill of rights reviewed.  Response: Patient verbalized agreement with plan. Will contact Dr Analisa Calero with update and for further orders.

## 2023-05-24 NOTE — H&P
"  May 24, 2023    Juliana Rodriguez  [id number]      OB Admit History & Physical      Ms. Rodriguez  is here for rule out labor.     She has noticed uterine contractions that started at 11 pm yesterday. ctxs have been getting progressively more strong and frequent. During triage she was found to be 4 cm dilated. Denies any bleeding, leaking or abnormal discharge. +FM    No LMP recorded. Patient is pregnant.   Her Estimated Date of Delivery: May 31, 2023, making her 39w0d.      Estimated body mass index is 22.14 kg/m  as calculated from the following:    Height as of 18: 1.6 m (5' 3\").    Weight as of 21: 56.7 kg (125 lb).  Her prenatal course has been  complicated by   -hx of IUFD  -hx of PTB (33 wks)  -hx of PPROM  - FADI  - Gestational thrombocytopenia      OB labs reviewed: O, Rubella immune, Heb B Ag non-reactive, Hep C NR, HIV negative, RPR negative  - Genetics: declined  - Anatomy ultrasound: L2 on 1/10/23, CL 3.37cm (transvaginal measurement declined). Complete anatomy survey 23.  - Rh positive, Rhogam not indicated  - GCT passed, Hgb 10.5, plts 116, Treponema NR  - flu declined, s/p Covid vaccine x2, Tdap declines   - GBS neg  - Feeding: breast, rx previously given. She got a pump from [employer] but she doesn't like that kind. She is not allowed to return it.   - Contraception: considering ParaGard IUD            She is a 27 year old   Her OB history:   OB History    Para Term  AB Living   6 3 1 2 1 3   SAB IAB Ectopic Multiple Live Births   1 0 0 0 3      # Outcome Date GA Lbr Bunny/2nd Weight Sex Delivery Anes PTL Lv   6 Current            5  21 34w0d 04:15 / 00:06 2.46 kg (5 lb 6.8 oz) M Vag-Spont None Y SABA      Name: ROBMALE-JULIANA      Apgar1: 9  Apgar5: 9   4 Term 19 40w3d 04:31 / 00:26 3.48 kg (7 lb 10.8 oz) F Vag-Spont None N SABA      Name: ALI,FEMALE-JULIANA      Apgar1: 8  Apgar5: 9   3  16 35w3d 06:50 / 00:23 2.631 kg (5 lb 12.8 oz) F Vag-Spont Local Y " SABA      Apgar1: 8  Apgar5: 9   2 SAB 13     SAB      1                    History reviewed. No pertinent past medical history.     History reviewed. No pertinent surgical history.      No current outpatient medications on file.       Allergies: Influenza vaccine live      REVIEW OF SYSTEMS:  NEUROLOGIC:  Negative  EYES:  Negative  ENT:  Negative  GI:  Negative  :  Negative  GYN:  Negative  CV:  Negative  PULMONARY:  Negative  MUSCULOSKELETAL:  Negative  PSYCH:  Negative      Social History     Socioeconomic History     Marital status:      Spouse name: Not on file     Number of children: Not on file     Years of education: Not on file     Highest education level: Not on file   Occupational History     Not on file   Tobacco Use     Smoking status: Never     Smokeless tobacco: Never   Vaping Use     Vaping status: Not on file   Substance and Sexual Activity     Alcohol use: No     Drug use: No     Sexual activity: Yes     Partners: Male     Comment: 2008 jennifer capellan md,janet 09   Other Topics Concern     Parent/sibling w/ CABG, MI or angioplasty before 65F 55M? Not Asked   Social History Narrative     Not on file     Social Determinants of Health     Financial Resource Strain: Not on file   Food Insecurity: Not on file   Transportation Needs: Not on file   Physical Activity: Not on file   Stress: Not on file   Social Connections: Not on file   Intimate Partner Violence: Not on file   Housing Stability: Not on file      Family History   Problem Relation Age of Onset     Unknown/Adopted Maternal Grandmother      Unknown/Adopted Maternal Grandfather      Unknown/Adopted Paternal Grandmother      Unknown/Adopted Paternal Grandfather      Unknown/Adopted Mother      Unknown/Adopted Father      Unknown/Adopted Brother      Unknown/Adopted Sister          Exam:    Vitals:   /67   Temp 98.9  F (37.2  C) (Oral)   Resp 18   [place]:  ctxs q 2-4 min  FHT: 130 bpm, mod, a +,  D-    Alert Awake  in NAD  HEENT grossly normal  Neck: no lymphadenopathy or thryoidomegaly  Lungs CTAB  Back No CVAT  Heart RR  ABD gravid, NABS, soft, NT on exam with NT fundus palpable  Cervix is /-/  EXT:  no edema, no calf tenderness      Assessment/Plan: Juliana Rodriguez is a 27 year old  at 39w0d GA here with active labor.     - admission orders in place  - prolonged monitoring  - FHT Cat I  - GBS neg  - Rh pos  - declining augmentation (AROM or pit) at this point  - declining pain management  - continue with expectant management  - anticipate     FADI  - hgb 11.5 upon admission    Gestational thrombocytopenia  - 134 upon admission      Analisa Calero MD  Dept of OB/GYN  May 24, 2023

## 2023-05-24 NOTE — PROGRESS NOTES
Patient uncomfortable with contractions.   FHTs 130s accels  TOCO q2-3  Cvx 8//-1  A/P Term labor  Anticipate     Laura Cano MD on 2023 at 7:32 AM

## 2023-05-24 NOTE — PLAN OF CARE
Goal Outcome Evaluation:      Plan of Care Reviewed With: patient    Overall Patient Progress: improvingOverall Patient Progress: improving     VSS, BP soft, slightly dizzy when getting out of bed. Pt received 500 ml bolus.  Up independent in room, voiding without difficultly. Pain managed with ibuprofen. Breastfeeding and formula feeding per parent preference. Bonding well with infant.

## 2023-05-25 VITALS
SYSTOLIC BLOOD PRESSURE: 97 MMHG | TEMPERATURE: 97.7 F | DIASTOLIC BLOOD PRESSURE: 58 MMHG | RESPIRATION RATE: 18 BRPM | HEART RATE: 68 BPM

## 2023-05-25 LAB — HGB BLD-MCNC: 10.9 G/DL (ref 11.7–15.7)

## 2023-05-25 PROCEDURE — 85018 HEMOGLOBIN: CPT | Performed by: OBSTETRICS & GYNECOLOGY

## 2023-05-25 PROCEDURE — 36415 COLL VENOUS BLD VENIPUNCTURE: CPT | Performed by: OBSTETRICS & GYNECOLOGY

## 2023-05-25 PROCEDURE — 250N000013 HC RX MED GY IP 250 OP 250 PS 637: Performed by: OBSTETRICS & GYNECOLOGY

## 2023-05-25 RX ORDER — IBUPROFEN 600 MG/1
600 TABLET, FILM COATED ORAL EVERY 6 HOURS PRN
Qty: 60 TABLET | Refills: 1 | Status: SHIPPED | OUTPATIENT
Start: 2023-05-25

## 2023-05-25 RX ORDER — DOCUSATE SODIUM 100 MG/1
100 CAPSULE, LIQUID FILLED ORAL 2 TIMES DAILY PRN
Qty: 60 CAPSULE | Refills: 1 | Status: SHIPPED | OUTPATIENT
Start: 2023-05-25

## 2023-05-25 RX ORDER — MODIFIED LANOLIN
OINTMENT (GRAM) TOPICAL
Qty: 7 G | Refills: 3 | Status: SHIPPED | OUTPATIENT
Start: 2023-05-25

## 2023-05-25 RX ORDER — HYDROCORTISONE 25 MG/G
CREAM TOPICAL 3 TIMES DAILY PRN
Qty: 30 G | Refills: 0 | Status: SHIPPED | OUTPATIENT
Start: 2023-05-25

## 2023-05-25 RX ADMIN — ACETAMINOPHEN 650 MG: 325 TABLET ORAL at 13:19

## 2023-05-25 RX ADMIN — DOCUSATE SODIUM 100 MG: 100 CAPSULE, LIQUID FILLED ORAL at 08:11

## 2023-05-25 RX ADMIN — IBUPROFEN 800 MG: 800 TABLET ORAL at 08:11

## 2023-05-25 ASSESSMENT — ACTIVITIES OF DAILY LIVING (ADL)
ADLS_ACUITY_SCORE: 18
DEPENDENT_IADLS:: INDEPENDENT
ADLS_ACUITY_SCORE: 18
ADLS_ACUITY_SCORE: 18

## 2023-05-25 NOTE — PROGRESS NOTES
Patient Name:  Juliana Rodriguez   MRN:  [id number]  Age:  27 year old    YOB: 1995      POSTPARTUM PROGRESS NOTE    She is doing well without complaints.  Pt is ambulating, voiding, tolerating a regular diet.  Pain is well controlled and lochia is within normal limits.  She is breastfeeding and bottle feeding.  Baby is doing well.    Pt desires early discharge today.    Objective:    Temp:  [97.6  F (36.4  C)-98.3  F (36.8  C)] 97.7  F (36.5  C)  Pulse:  [59-70] 68  Resp:  [16-18] 18  BP: ()/(45-70) 97/58  0 lbs 0 oz    General Appearance:  NAD  Abdomen:  nontender, nondistended  Fundus:  firm, below the umbilicus      Lower extremities:  no significant edema    Lab Review:    ABO/RH(D)   Date Value Ref Range Status   05/24/2023 O POS  Final     Hemoglobin   Date Value Ref Range Status   05/25/2023 10.9 (L) 11.7 - 15.7 g/dL Final   05/24/2023 11.3 (L) 11.7 - 15.7 g/dL Final   05/24/2023 11.5 (L) 11.7 - 15.7 g/dL Final   01/15/2021 10.5 (L) 11.7 - 15.7 g/dL Final   01/14/2021 11.4 (L) 11.7 - 15.7 g/dL Final   08/04/2019 11.7 11.7 - 15.7 g/dL Final     Hematocrit   Date Value Ref Range Status   05/24/2023 35.3 35.0 - 47.0 % Final   04/05/2018 41.8 35.0 - 47.0 % Final   08/14/2015 39.7 35.0 - 47.0 % Final   07/19/2015 40.6 35.0 - 47.0 % Final       Lab Results   Component Value Date    WBC 6.9 05/24/2023    WBC 5.8 04/05/2018     Lab Results   Component Value Date    RBC 4.13 05/24/2023    RBC 4.83 04/05/2018     Lab Results   Component Value Date    HGB 10.9 05/25/2023    HGB 10.5 01/15/2021     Lab Results   Component Value Date    HCT 35.3 05/24/2023    HCT 41.8 04/05/2018     No components found for: MCT  Lab Results   Component Value Date    MCV 86 05/24/2023    MCV 87 04/05/2018     Lab Results   Component Value Date    MCH 27.8 05/24/2023    MCH 28.2 04/05/2018     Lab Results   Component Value Date    MCHC 32.6 05/24/2023    MCHC 32.5 04/05/2018     Lab Results   Component Value Date    RDW  15.6 05/24/2023    RDW 13.3 04/05/2018     Lab Results   Component Value Date     05/24/2023     04/05/2018       Assessment:  PPD#1 s/p vaginal delivery, doing well.    Plan:   - Postpartum: recovering well. Pain well controlled. Cont PO pain meds and regular diet. Encourage ambulation.  - FADI: Hgb 11.3 on admission > qbl 50mL > PPD1 10.9. Plan iron-rich foods.  - Gestational Thrombocytopenia > stable on admission, mild >100K  - Contraception: considering cIUD  - Dispo: anticipate DC PPD#1

## 2023-05-25 NOTE — DISCHARGE SUMMARY
[facility] Discharge Summary    Juliana Rodriguez MRN# [id number]   Age: 27 year old YOB: 1995     Date of Admission:  2023  Date of Discharge::  23  Admitting Physician:  Analisa Calero MD  Discharge Physician:  Jony Negron MD          Admission Diagnoses:   -IUP at 39w0d  - Spontaneous labor  - gestational thrombocytopenia  - FADI          Discharge Diagnosis:     -IUP at 39w0d, now delivered  -gestational thrombocytopenia, mild, stable  -FADI, mild, a-stx          Procedures:     Procedure(s): -            Medications Prior to Admission:     Medications Prior to Admission   Medication Sig Dispense Refill Last Dose     Prenatal Vit-Fe Fumarate-FA (PNV PRENATAL PLUS MULTIVITAMIN) 27-1 MG TABS per tablet Take 1 tablet by mouth daily 60 tablet 1 2023     ibuprofen (ADVIL/MOTRIN) 600 MG tablet Take 1 tablet (600 mg) by mouth every 6 hours as needed for moderate pain 90 tablet 0      Misc. Devices (BREAST PUMP) MISC 1 each as needed 1 each 0      senna-docusate (SENOKOT-S/PERICOLACE) 8.6-50 MG tablet Take 1 tablet by mouth 2 times daily as needed for constipation 120 tablet 1      [DISCONTINUED] ferrous sulfate (FEROSUL) 325 (65 Fe) MG tablet Take 1 tablet (325 mg) by mouth daily (with breakfast) 60 tablet 0 2023             Discharge Medications:     Current Discharge Medication List      CONTINUE these medications which have NOT CHANGED    Details   ferrous sulfate (FEROSUL) 325 (65 Fe) MG tablet Take 1 tablet (325 mg) by mouth daily (with breakfast)  Qty: 60 tablet, Refills: 0    Associated Diagnoses: Indication for care in labor or delivery;  (spontaneous vaginal delivery)      Prenatal Vit-Fe Fumarate-FA (PNV PRENATAL PLUS MULTIVITAMIN) 27-1 MG TABS per tablet Take 1 tablet by mouth daily  Qty: 60 tablet, Refills: 1    Associated Diagnoses:  (spontaneous vaginal delivery)      ibuprofen (ADVIL/MOTRIN) 600 MG tablet Take 1 tablet (600 mg) by mouth every 6 hours  as needed for moderate pain  Qty: 90 tablet, Refills: 0    Associated Diagnoses: Indication for care in labor or delivery;  (spontaneous vaginal delivery)      Misc. Devices (BREAST PUMP) MISC 1 each as needed  Qty: 1 each, Refills: 0    Associated Diagnoses: Breast feeding status of mother      senna-docusate (SENOKOT-S/PERICOLACE) 8.6-50 MG tablet Take 1 tablet by mouth 2 times daily as needed for constipation  Qty: 120 tablet, Refills: 1    Associated Diagnoses: Indication for care in labor or delivery;  (spontaneous vaginal delivery)                  Brief Admission History   Ms. Rodriguez  is here for rule out labor.      She has noticed uterine contractions that started at 11 pm yesterday. ctxs have been getting progressively more strong and frequent. During triage she was found to be 4 cm dilated. Denies any bleeding, leaking or abnormal discharge. +FM     No LMP recorded. Patient is pregnant.   Her Estimated Date of Delivery: May 31, 2023, making her 39w0d.           Brief Intrapartum Course:   Assessment/Plan: Juliana Rodriguez is a 27 year old  at 39w0d GA here with active labor.      - admission orders in place  - prolonged monitoring  - FHT Cat I  - GBS neg  - Rh pos  - declining augmentation (AROM or pit) at this point  - declining pain management  - continue with expectant management  - anticipate      FADI  - hgb 11.5 upon admission     Gestational thrombocytopenia  - 134 upon admission           Hospital Course:   The patient's hospital course was unremarkable.  On discharge, her pain was well controlled. Vaginal bleeding is similar to peak menstrual flow.  Voiding without difficulty.  Ambulating well and tolerating a normal diet.  No fever.  Breastfeeding well.  Infant is stable. She was discharged on post-partum day #1.     Her platelets were stable >100K.  Plan for iron-rich foods at home.  Pt considering cIUD postpartum.    Post-partum hemoglobin:   Hemoglobin   Date Value Ref Range Status    05/25/2023 10.9 (L) 11.7 - 15.7 g/dL Final   01/15/2021 10.5 (L) 11.7 - 15.7 g/dL Final             Discharge Instructions and Follow-Up:     Discharge diet: Regular   Discharge activity: Pelvic rest for 6 weeks including no sexual intercourse, tampons, or douching.   Discharge follow-up: Follow up with your primary OB for a routine postpartum visit in 6 weeks           Discharge Disposition:     Discharged to home     David Rakoff, MD [facility]

## 2023-05-25 NOTE — PLAN OF CARE
Vital signs stable. Postpartum assessment WDL. Pain well controlled. Up ad/karel. Voiding w/o difficulty. Tolerating a regular diet. Breastfeeding and bottle feeding every 2-3 hours. Bonding well with . Will continue to monitor. Questions/concerns addressed.

## 2023-05-25 NOTE — DISCHARGE INSTRUCTIONS
"Postpartum Discharge Instructions  ACTIVITY:  - You may ride in a car, but no driving for 1-2 weeks.  - Do not lift anything heavier than your baby for 6 weeks.  - You may slowly go up and down stairs as you feel able.  - Resume other exercises after 6 weeks.  - Rest when your baby is sleeping.  - Call your doctor if you are feeling \"blue\" for more than 2 weeks.  - Call your doctor immediately or go the Emergency Center if you think you might hurt yourself or your baby.  HYGIENE:  - You may take a tub bath or shower.  - Continue using a saul bottle or sitz bath for comfort or cleanliness.  - No douching or tampon use until after 6 week checkup.  DIET:  - Wait 6 weeks before dieting to lose weight.  - Aim for gradual weight loss through healthy eating habits.  - Take a vitamin daily unless otherwise directed.  BREASTFEEDING:  - Refer to Breastfeeding Guidelines booklet or call Breastfeeding support [place]: [phone]  MEDICATIONS:  - Use as directed on prescription.  PAIN MANAGEMENT:    Breast Care:  - If not breastfeeding, apply ice packs to your breasts 3 times per day for 15 minutes.  Wear a tight bra for at least one week.  - If breastfeeding, nurse often to get relief, pain medication as directed.  IF Laceration:  - Continue use of saul bottle and sitz bath as directed.  - Use Dermoplast and/or Tucks as directed.  IF  Incision:  - Splint incision when moving and turning.  - Medications as instructed.  SPECIAL INFORMATION:  - No sexual intercourse for 6 weeks.  After that, use a barrier contraception until your doctor tells you it is ok to use something different.     - Breastfeeding is not a method of birth control.  - You may have a period while breastfeeding.  Your first period may come 4 to 10 weeks after delivery, or later if you are breastfeeding.  - Avoid constipation.  Drink plenty of water, eat vegetables and fruits high in natural fiber, high grain breads and cereals.  You may use a stool " softener as necessary.  COMPLICATIONS:  Call you doctor if any of the following occur:  - Continuing bright red vaginal bleeding or clots larger than a lemon.  - Pain or redness in the breasts.  - Fever over 100.4 when temperature is taken by mouth.  - Burning feeling with urination.  - Bad smelling vaginal drainage.  - Incision or episiotomy pulls apart, is red or has draiage.  Postpartum Vaginal Delivery Instructions    Activity     Ask family and friends for help when you need it.  Do not place anything in your vagina for 6 weeks.  You are not restricted on other activities, but take it easy for a few weeks to allow your body to recover from delivery.  You are able to do any activities you feel up to that point.  No driving until you have stopped taking your pain medications (usually two weeks after delivery).     Call your health care provider if you have any of these symptoms:     Increased pain, swelling, redness, or fluid around your stiches from an episiotomy or perineal tear.  A fever above 100.4 F (38 C) with or without chills when placing a thermometer under your tongue.  You soak a sanitary pad with blood within 1 hour, or you see blood clots larger than a golf ball.  Bleeding that lasts more than 6 weeks.  Vaginal discharge that smells bad.  Severe pain, cramping or tenderness in your lower belly area.  A need to urinate more frequently (use the toilet more often), more urgently (use the toilet very quickly), or it burns when you urinate.  Nausea and vomiting.  Redness, swelling or pain around a vein in your leg.  Problems breastfeeding or a red or painful area on your breast.  Chest pain and cough or are gasping for air.  Problems coping with sadness, anxiety, or depression.  If you have any concerns about hurting yourself or the baby, call your provider immediately.   You have questions or concerns after you return home.     Keep your hands clean:  Always wash your hands before touching your perineal  area and stitches.  This helps reduce your risk of infection.  If your hands aren't dirty, you may use an alcohol hand-rub to clean your hands. Keep your nails clean and short.

## 2023-05-25 NOTE — PLAN OF CARE
Vital signs stable. Pain well controlled. Breastfeeding and supplementing with formula per parental request. Pt received complete discharge paperwork and home medications. Pt received a breast pump for home. Discharge outcomes on care plan met. Pt states understanding and comfort with self care and follow up care. Pt had no further questions at the time of discharge and no unmet needs were identified. ID bands double checked and verified with parents and . Electronic security band removed from . Pt discharged on 2023.

## 2024-05-03 ENCOUNTER — HOSPITAL ENCOUNTER (EMERGENCY)
Facility: HOSPITAL | Age: 29
End: 2024-05-03
Payer: COMMERCIAL

## 2024-10-28 NOTE — PLAN OF CARE
"Data: [facility]: 2021  8:59 PM.  Reason for maternal/fetal assessment is leaking vaginal fluid. Patient reports feeling a continuous trickle of fluid since 0100 this morning. Patient uses progesterone suppositories for  labor prevention, and reports feeling occasional \"leaking of fluid\" but states that \"this feels different; more steady.\"  Patient is a .  Prenatal record reviewed. Pregnancy  has been complicated by history of  labor & fetal demise at 21 weeks with previous pregnancy.  Gestational Age 33w6d. VSS. Fetal movement present. Patient denies vaginal bleeding, abdominal pain, pelvic pressure, nausea, vomiting, headache, visual disturbances, epigastric or URQ pain, significant edema. Support person is present.   Action: Verbal consent for EFM. Triage assessment completed. Bill of rights reviewed.  Response: Patient verbalized agreement with plan. Will contact Dr Jony Sanchze with update and further orders.  "
"Patient stable. VSS. Antibiotics and first dose of betamethasone given overnight. Patient ruthann about every 6-7 minutes, palpating mild. Patient rates them as abdominal tightening or as \"baby moving.\" FHT overall moderate variability with accels but more minimal within the last hour or so. Prolonged decel noted from [id number] with jaquelin in the 80's. Multiple position changes attempted and HR returned to baseline. MD in department and reviewed tracing. Plan to have patient hold off on eating anything at this time until category 1 tracing returns.   "
Data: Juliana Rodriguez transferred to 450 via wheelchair at 2130. Baby is in NICU  Action: Receiving unit notified of transfer: Yes. Patient and family notified of room change. Report given to [facility] at 2130. Belongings sent to receiving unit. Accompanied by Registered Nurse. Oriented patient to surroundings. Call light within reach. .  Response: Patient tolerated transfer and is stable.  
Data: Vital signs within normal limits. Postpartum checks within normal limits - see flow record. Patient eating and drinking normally. Patient able to empty bladder independently and is up ambulating. No apparent signs of infection.  Patient performing self cares and is able to care for infant.  Action: Patient medicated during the shift for pain and cramping. See MAR. Patient reassessed within 1 hour after each medication and pain was improved - patient stated she was comfortable. Patient education done about when to call the doctor, pumping, and pain managment. See flow record.  Response: Positive attachment behaviors observed with infant. Support persons  present.   Plan: Anticipate discharge on 1/16/2021.  
Patient meeting expected goals.  Is up independent in room, meeting all personal needs. VSS; blood pressure improving. Denies feeling lightheaded or dizzy when OOB.  Encouraged frequent pumping and visiting NICU.  Pain;cramping, is being managed with Ibuprofen. Also now taking Iron. Encouraged taking stool softner.  Patient is stable and should be ready for discharge later today.  No breast pump is needed. Would like Senna, Ferrous and Ibuprofen for going home.   
Patient meeting expected goals. Is up independent in room, meeting all personal needs. Soft blood pressures; encouraged fluids and to order variety of fluids with breakfast. Denies feeling dizzy or lightheaded when up OOB. Has pumped down in NICU. Will encourage to begin pumping between visits to NICU. IV remains SL due to lower blood pressure.  
Patient vital signs stable and meeting expected outcomes.  Went to NICU X1 overnight.  Up independently and voiding adequately.  Pain well controlled with ibuprofen.  Able to perform all cares for self.   Will continue to monitor.      
VSS.  Pain managed well with ibuprofen; abdominal binder provided for comfort.  Patient ambulating and voiding without difficulty; states previous dizziness has resolved.  Patient pumping and delivering milk to NICU for infant; pumped X 1 and plans to pump again in AM and rest tonight; encouraged to pump every 2-4 hours.    
VSS; fluids strongly encouraged for a lower blood pressure. Patient requested iron - said she was taking it prenatally and is feeling a little dizzy - order obtained from MD. Suggested she got to NICU via wheelchair instead of walking. Managing her occasional cramping pain with motrin. Planning for discharge to home tomorrow.   
related to inadequate intake